# Patient Record
Sex: MALE | Race: WHITE | NOT HISPANIC OR LATINO | URBAN - METROPOLITAN AREA
[De-identification: names, ages, dates, MRNs, and addresses within clinical notes are randomized per-mention and may not be internally consistent; named-entity substitution may affect disease eponyms.]

---

## 2017-02-22 ENCOUNTER — EMERGENCY (EMERGENCY)
Facility: HOSPITAL | Age: 52
LOS: 1 days | Discharge: HOME | End: 2017-02-22
Attending: EMERGENCY MEDICINE | Admitting: EMERGENCY MEDICINE
Payer: MEDICARE

## 2017-02-22 VITALS
HEART RATE: 66 BPM | DIASTOLIC BLOOD PRESSURE: 75 MMHG | TEMPERATURE: 98 F | SYSTOLIC BLOOD PRESSURE: 115 MMHG | OXYGEN SATURATION: 96 % | RESPIRATION RATE: 16 BRPM

## 2017-02-22 DIAGNOSIS — F10.129 ALCOHOL ABUSE WITH INTOXICATION, UNSPECIFIED: ICD-10-CM

## 2017-02-22 DIAGNOSIS — F17.210 NICOTINE DEPENDENCE, CIGARETTES, UNCOMPLICATED: ICD-10-CM

## 2017-02-22 DIAGNOSIS — R41.82 ALTERED MENTAL STATUS, UNSPECIFIED: ICD-10-CM

## 2017-02-22 PROCEDURE — 99283 EMERGENCY DEPT VISIT LOW MDM: CPT

## 2017-02-22 NOTE — ED PROVIDER NOTE - MEDICAL DECISION MAKING DETAILS
Awaiting clinical sobriety.   The scribe's documentation has been prepared under my direction and personally reviewed by me in its entirety. I confirm that the note above accurately reflects all work, treatment, procedures, and medical decision making performed by me. - NP Colon 54 y/o M presents to ED c/o with alcohol intoxication.  No overt signs of trauma.  VSS.  NAD.  Pt monitored for sobriety and discharged with clear speech and steady gait.    The scribe's documentation has been prepared under my direction and personally reviewed by me in its entirety. I confirm that the note above accurately reflects all work, treatment, procedures, and medical decision making performed by me. - NP Colon

## 2017-02-22 NOTE — ED PROVIDER NOTE - OBJECTIVE STATEMENT
54 yo M with a hx of alcohol abuse BIBEMS for suspected alcohol intoxication today. Pt was picked up on the street. As per triage note, pt said that last drink was at 8AM. HPI and ROS are limited due to altered mental status. No evidence of trauma, vomiting or seizure noted.

## 2017-02-22 NOTE — ED PROVIDER NOTE - NS ED MD SCRIBE ATTENDING SCRIBE SECTIONS
HISTORY OF PRESENT ILLNESS/HIV/REVIEW OF SYSTEMS/PAST MEDICAL/SURGICAL/SOCIAL HISTORY/DISPOSITION/VITAL SIGNS( Pullset)/PHYSICAL EXAM

## 2017-05-30 ENCOUNTER — EMERGENCY (EMERGENCY)
Facility: HOSPITAL | Age: 52
LOS: 1 days | Discharge: HOME | End: 2017-05-30
Attending: EMERGENCY MEDICINE | Admitting: EMERGENCY MEDICINE
Payer: MEDICARE

## 2017-05-30 VITALS
DIASTOLIC BLOOD PRESSURE: 82 MMHG | OXYGEN SATURATION: 98 % | HEART RATE: 68 BPM | SYSTOLIC BLOOD PRESSURE: 137 MMHG | TEMPERATURE: 97 F | RESPIRATION RATE: 18 BRPM

## 2017-05-30 DIAGNOSIS — R41.82 ALTERED MENTAL STATUS, UNSPECIFIED: ICD-10-CM

## 2017-05-30 DIAGNOSIS — F10.129 ALCOHOL ABUSE WITH INTOXICATION, UNSPECIFIED: ICD-10-CM

## 2017-05-30 PROCEDURE — 99283 EMERGENCY DEPT VISIT LOW MDM: CPT | Mod: 25

## 2017-05-30 NOTE — ED ADULT NURSE NOTE - OBJECTIVE STATEMENT
51y male, BIBEMS for alcohol intoxication. pt admits to drinking today. Denies use of any other drugs. pt noted with redness to left eye, states "I fell a day ago." Denies n/v, confusion, dizziness, chest pain, SOB. No obvious trauma/deformity. Pt is undomiciled. Fall prec initiated. f/s: 148mg/dl.

## 2017-05-30 NOTE — ED ADULT NURSE NOTE - CHPI ED SYMPTOMS NEG
no weakness/no chills/no pain/no abdominal pain/no nausea/no vomiting/no fever/no abdominal distension

## 2017-05-30 NOTE — ED ADULT TRIAGE NOTE - CHIEF COMPLAINT QUOTE
pt biba for alcohol intoxication. pt is alert and awake, also has an injury to left eye. states he has not yet gotten treatment for it and it happened last night.

## 2017-05-31 VITALS
OXYGEN SATURATION: 97 % | RESPIRATION RATE: 20 BRPM | SYSTOLIC BLOOD PRESSURE: 147 MMHG | DIASTOLIC BLOOD PRESSURE: 78 MMHG | HEART RATE: 60 BPM

## 2017-05-31 NOTE — ED PROVIDER NOTE - UNABLE TO OBTAIN
Urgent need for Intervention Patient appears intoxicated, unable to provide a history or cooperate with physical exam.

## 2017-06-12 ENCOUNTER — EMERGENCY (EMERGENCY)
Facility: HOSPITAL | Age: 52
LOS: 1 days | Discharge: HOME | End: 2017-06-12
Attending: EMERGENCY MEDICINE | Admitting: EMERGENCY MEDICINE
Payer: MEDICARE

## 2017-06-12 VITALS
HEART RATE: 86 BPM | SYSTOLIC BLOOD PRESSURE: 120 MMHG | DIASTOLIC BLOOD PRESSURE: 80 MMHG | OXYGEN SATURATION: 99 % | RESPIRATION RATE: 16 BRPM

## 2017-06-12 VITALS
RESPIRATION RATE: 18 BRPM | DIASTOLIC BLOOD PRESSURE: 84 MMHG | TEMPERATURE: 98 F | SYSTOLIC BLOOD PRESSURE: 125 MMHG | HEART RATE: 93 BPM

## 2017-06-12 DIAGNOSIS — F10.129 ALCOHOL ABUSE WITH INTOXICATION, UNSPECIFIED: ICD-10-CM

## 2017-06-12 DIAGNOSIS — F17.200 NICOTINE DEPENDENCE, UNSPECIFIED, UNCOMPLICATED: ICD-10-CM

## 2017-06-12 DIAGNOSIS — Z88.0 ALLERGY STATUS TO PENICILLIN: ICD-10-CM

## 2017-06-12 DIAGNOSIS — R41.82 ALTERED MENTAL STATUS, UNSPECIFIED: ICD-10-CM

## 2017-06-12 PROCEDURE — 99283 EMERGENCY DEPT VISIT LOW MDM: CPT

## 2017-06-12 NOTE — ED PROVIDER NOTE - PHYSICAL EXAMINATION
Gen - Disheveled, +AOB, awake and arousable by verbal stimuli  Skin - warm, dry, intact  HEENT - AT/NC, PERRL, mild conjunctival injection, +L subacute subconjunctival hemorrhage, o/p clear, uvula midline, airway patent, neck supple with no step off   CV - S1S2, R/R/R, +2/6 systolic murmur   Resp - respiration non-labored, CTAB, symmetric bs b/l, no r/r/w  GI - NABS, soft, ND, NT, no rebound or guarding, no CVAT b/l  MS - moving all extremities, no c/c/e   Neuro - Lethargic but arousable by verbal stimuli, slurred speech and unsteady gait   Skin - old sutures to the L eyebrow region, no d/c or bleeding

## 2017-06-12 NOTE — ED ADULT TRIAGE NOTE - CHIEF COMPLAINT QUOTE
As per EMS pt walking in the street and reports to EMS crew that he had been drinking vodka all day.

## 2017-06-12 NOTE — ED PROVIDER NOTE - OBJECTIVE STATEMENT
50 yo M with chronic EtoH abuse, undomiciled, BIBA for public EtOH intox. Admits to having heavy EtOH intake today. Had trauma few days ago with sutures to the L eyebrow region.  Denies trauma/fall today, HA, dizziness, bleeding, N/V/D/C, CP, SOB, palpitations, tremors, change in urinary/bowel function, and abdominal pain. No illicit drug use noted.

## 2017-07-31 ENCOUNTER — EMERGENCY (EMERGENCY)
Facility: HOSPITAL | Age: 52
LOS: 1 days | Discharge: HOME | End: 2017-07-31
Attending: EMERGENCY MEDICINE | Admitting: EMERGENCY MEDICINE
Payer: SELF-PAY

## 2017-07-31 VITALS
HEART RATE: 98 BPM | OXYGEN SATURATION: 96 % | TEMPERATURE: 99 F | RESPIRATION RATE: 16 BRPM | SYSTOLIC BLOOD PRESSURE: 139 MMHG | DIASTOLIC BLOOD PRESSURE: 90 MMHG

## 2017-07-31 VITALS
RESPIRATION RATE: 16 BRPM | DIASTOLIC BLOOD PRESSURE: 63 MMHG | SYSTOLIC BLOOD PRESSURE: 172 MMHG | HEART RATE: 74 BPM | OXYGEN SATURATION: 100 %

## 2017-07-31 DIAGNOSIS — F10.10 ALCOHOL ABUSE, UNCOMPLICATED: ICD-10-CM

## 2017-07-31 DIAGNOSIS — R56.9 UNSPECIFIED CONVULSIONS: ICD-10-CM
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
## 2017-07-31 DIAGNOSIS — I10 ESSENTIAL (PRIMARY) HYPERTENSION: ICD-10-CM

## 2017-07-31 DIAGNOSIS — F17.200 NICOTINE DEPENDENCE, UNSPECIFIED, UNCOMPLICATED: ICD-10-CM

## 2017-07-31 LAB
ANION GAP SERPL CALC-SCNC: 12 MMOL/L — SIGNIFICANT CHANGE UP (ref 9–16)
BUN SERPL-MCNC: 6 MG/DL — LOW (ref 7–23)
CALCIUM SERPL-MCNC: 9.1 MG/DL — SIGNIFICANT CHANGE UP (ref 8.5–10.5)
CHLORIDE SERPL-SCNC: 100 MMOL/L — SIGNIFICANT CHANGE UP (ref 96–108)
CO2 SERPL-SCNC: 24 MMOL/L — SIGNIFICANT CHANGE UP (ref 22–31)
CREAT SERPL-MCNC: 1.03 MG/DL — SIGNIFICANT CHANGE UP (ref 0.5–1.3)
ETHANOL SERPL-MCNC: <3 MG/DL — SIGNIFICANT CHANGE UP
GLUCOSE SERPL-MCNC: 134 MG/DL — HIGH (ref 70–99)
HCT VFR BLD CALC: 38.5 % — LOW (ref 39–50)
HGB BLD-MCNC: 13.4 G/DL — SIGNIFICANT CHANGE UP (ref 13–17)
MCHC RBC-ENTMCNC: 32.3 PG — SIGNIFICANT CHANGE UP (ref 27–34)
MCHC RBC-ENTMCNC: 34.8 G/DL — SIGNIFICANT CHANGE UP (ref 32–36)
MCV RBC AUTO: 92.8 FL — SIGNIFICANT CHANGE UP (ref 80–100)
PLATELET # BLD AUTO: 104 K/UL — LOW (ref 150–400)
POTASSIUM SERPL-MCNC: 3.8 MMOL/L — SIGNIFICANT CHANGE UP (ref 3.5–5.3)
POTASSIUM SERPL-SCNC: 3.8 MMOL/L — SIGNIFICANT CHANGE UP (ref 3.5–5.3)
RBC # BLD: 4.15 M/UL — LOW (ref 4.2–5.8)
RBC # FLD: 13.9 % — SIGNIFICANT CHANGE UP (ref 10.3–16.9)
SODIUM SERPL-SCNC: 136 MMOL/L — SIGNIFICANT CHANGE UP (ref 132–145)
WBC # BLD: 4.6 K/UL — SIGNIFICANT CHANGE UP (ref 3.8–10.5)
WBC # FLD AUTO: 4.6 K/UL — SIGNIFICANT CHANGE UP (ref 3.8–10.5)

## 2017-07-31 PROCEDURE — 70450 CT HEAD/BRAIN W/O DYE: CPT | Mod: 26

## 2017-07-31 PROCEDURE — 99284 EMERGENCY DEPT VISIT MOD MDM: CPT

## 2017-07-31 RX ORDER — SODIUM CHLORIDE 9 MG/ML
1000 INJECTION INTRAMUSCULAR; INTRAVENOUS; SUBCUTANEOUS ONCE
Qty: 0 | Refills: 0 | Status: COMPLETED | OUTPATIENT
Start: 2017-07-31 | End: 2017-07-31

## 2017-07-31 RX ADMIN — Medication 1 MILLIGRAM(S): at 14:26

## 2017-07-31 RX ADMIN — Medication 100 MILLIGRAM(S): at 14:10

## 2017-07-31 RX ADMIN — SODIUM CHLORIDE 1000 MILLILITER(S): 9 INJECTION INTRAMUSCULAR; INTRAVENOUS; SUBCUTANEOUS at 14:10

## 2017-07-31 NOTE — ED ADULT TRIAGE NOTE - CHIEF COMPLAINT QUOTE
Per EMS, patient had witnessed seizure on street. post-ictal when EMS arrived at scene. On arrival to ED, patient alert to self, place, and approx. time. Hx. Chronic alcoholism--last drink "Yesterday". Seizure/fall precautions initiated.

## 2017-07-31 NOTE — ED PROVIDER NOTE - MEDICAL DECISION MAKING DETAILS
Pt well known to ED for Alcohol abuse withdrawal seizures. BIBA for witnessed seizure. Will get head CT, basic labs, alcohol blood level. Gave 1mg ativan IV and 100mg Librium PO. Will reassess. Mildly postictal at this time

## 2017-07-31 NOTE — ED PROVIDER NOTE - OBJECTIVE STATEMENT
51 y/o M    PMHx: Alcohol abuse, HTN    Pt brought in by EMS for witnessed seizure. Pt has had many Alcohol abuse withdrawal seizures in passed and is well known to ED. Admits the last time he drank was at yesterday evening. Denies head injury. Denies any alcohol use today. Denies headache, weakness, numbness, visual changes, speech changes

## 2017-07-31 NOTE — ED PROVIDER NOTE - ATTENDING CONTRIBUTION TO CARE
well known to the ED for alcohol abuse.  BIBEMS for seizure.  Given librium, likely alcohol withdrawal (normal BAL).  ED workup - labs, imaging unremarkable.  No witnessed seizures.  Steady gait.  Discussed findings with patient and cleared for discharge

## 2017-07-31 NOTE — ED PROVIDER NOTE - PROGRESS NOTE DETAILS
Pt now walking with very steady gait- no sign of postictal behavior   CNs intact. Normal Romberg. Normal finger to nose. No pronator drift. Normal rapid alternating movements/heal to shin. Sensation intact to all extremities. 5/5 strength to all extremities.   CT head normal. Workup WNL. No tachycardia- vitals stable. No tremors or shaking.  Will DC home with return precautions

## 2017-07-31 NOTE — ED ADULT NURSE NOTE - OBJECTIVE STATEMENT
Calm answering questions appropriately. Pt states he drinks heavily daily. Has no desire to stop drinking at this time. Has hx of seizures. Pt appears unkempt. Denies any pain. No deformities noted. Pt does not appear tremulous. Calm answering questions appropriately. Pt states he drinks heavily daily. Has no desire to stop drinking at this time. Has hx of seizures. Pt appears unkempt. Denies any pain. No deformities noted. Tremulous when using muscle, not at rest.

## 2017-07-31 NOTE — ED PROVIDER NOTE - ENMT, MLM
Airway patent, Nasal mucosa clear. Mouth with normal mucosa. Throat has no vesicles, no oropharyngeal exudates and uvula is midline. Bitye marks to bilateral [id number]. No active bleeding

## 2017-07-31 NOTE — ED PROVIDER NOTE - CONSTITUTIONAL, MLM
normal... Well disheveled poor hygiene, well nourished, awake, alert, oriented to person, place, time/situation and in no apparent distress.

## 2017-07-31 NOTE — ED PROVIDER NOTE - PHYSICAL EXAMINATION
CNs intact. Normal Romberg. Normal finger to nose. No pronator drift. Normal rapi alternating movements/heal to shin. Sensation intact to all extremities. 5/5 strength to all extremities.

## 2017-11-04 ENCOUNTER — EMERGENCY (EMERGENCY)
Facility: HOSPITAL | Age: 52
LOS: 1 days | Discharge: HOME | End: 2017-11-04
Admitting: EMERGENCY MEDICINE
Payer: MEDICARE

## 2017-11-04 VITALS
SYSTOLIC BLOOD PRESSURE: 107 MMHG | TEMPERATURE: 98 F | DIASTOLIC BLOOD PRESSURE: 70 MMHG | HEART RATE: 80 BPM | OXYGEN SATURATION: 98 % | RESPIRATION RATE: 20 BRPM

## 2017-11-04 DIAGNOSIS — R41.82 ALTERED MENTAL STATUS, UNSPECIFIED: ICD-10-CM

## 2017-11-04 DIAGNOSIS — Z88.0 ALLERGY STATUS TO PENICILLIN: ICD-10-CM

## 2017-11-04 DIAGNOSIS — F10.129 ALCOHOL ABUSE WITH INTOXICATION, UNSPECIFIED: ICD-10-CM

## 2017-11-04 PROCEDURE — 99283 EMERGENCY DEPT VISIT LOW MDM: CPT

## 2017-11-04 NOTE — ED PROVIDER NOTE - OBJECTIVE STATEMENT
52 y/o Male BIBA for AMs. Pt was found on the street intoxicated. At this time, pt does not have any complaints and denies pain. Pt is a poor historian.

## 2017-11-04 NOTE — ED PROVIDER NOTE - MEDICAL DECISION MAKING DETAILS
No signs of clinical trauma, will wait until sobriety to discharge. patient BIB EMS for AMS with ETOH intoxication. No signs of trauma, will observe until clinical sobriety

## 2017-11-04 NOTE — ED PROVIDER NOTE - PMH
Alcohol abuse    Alcohol abuse    ETOH abuse    ETOH abuse    No pertinent past medical history    Seizure

## 2017-11-04 NOTE — ED PROVIDER NOTE - PROGRESS NOTE DETAILS
The scribe's documentation has been prepared under my direction and personally reviewed by me in its entirety. I confirm that the note above accurately reflects all work, treatment, procedures, and medical decision making performed by me. signed out to me pending clinical sobriety. Patient is now a&ox3, coherent, steady gait, will d/c.

## 2017-11-16 ENCOUNTER — EMERGENCY (EMERGENCY)
Facility: HOSPITAL | Age: 52
LOS: 1 days | Discharge: HOME | End: 2017-11-16
Admitting: EMERGENCY MEDICINE
Payer: SELF-PAY

## 2017-11-16 VITALS
SYSTOLIC BLOOD PRESSURE: 110 MMHG | RESPIRATION RATE: 16 BRPM | HEART RATE: 78 BPM | OXYGEN SATURATION: 100 % | DIASTOLIC BLOOD PRESSURE: 68 MMHG | TEMPERATURE: 97 F

## 2017-11-16 VITALS
OXYGEN SATURATION: 99 % | TEMPERATURE: 98 F | SYSTOLIC BLOOD PRESSURE: 116 MMHG | RESPIRATION RATE: 16 BRPM | DIASTOLIC BLOOD PRESSURE: 74 MMHG | HEART RATE: 63 BPM

## 2017-11-16 DIAGNOSIS — F10.129 ALCOHOL ABUSE WITH INTOXICATION, UNSPECIFIED: ICD-10-CM
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
## 2017-11-16 DIAGNOSIS — S60.417A ABRASION OF LEFT LITTLE FINGER, INITIAL ENCOUNTER: ICD-10-CM
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
## 2017-11-16 DIAGNOSIS — R41.82 ALTERED MENTAL STATUS, UNSPECIFIED: ICD-10-CM

## 2017-11-16 DIAGNOSIS — Z88.0 ALLERGY STATUS TO PENICILLIN: ICD-10-CM

## 2017-11-16 PROCEDURE — 99284 EMERGENCY DEPT VISIT MOD MDM: CPT

## 2017-11-16 NOTE — ED PROVIDER NOTE - MEDICAL DECISION MAKING DETAILS
alcohol intox with multiple ED visits for same.  abrasion.  local wound care done in ED.  pt fed and appropriate for d/c  At the time of discharge from the Emergency Department, the patient is alert with fluent appropriate speech and ambulatory without difficulty. A complete medical screening examination was performed and no emergency medical condition was identified.

## 2017-11-16 NOTE — ED PROVIDER NOTE - OBJECTIVE STATEMENT
Pt is 50 yo male bib ems for alcohol intoxication.  Pt states "I need food".  Pt noted to have abrasion to left 5th dorsal finger with no deep injury or decreased ROM.  No other signs of trauma and pt with no medical complaints.

## 2017-11-16 NOTE — ED PROVIDER NOTE - PHYSICAL EXAMINATION
General: lethargic, arousable to touch, smells of alcohol  Head: NCAT  Eyes: PERRL  Heart: RRR  Lungs: CTAB  Abd: soft, NTND  Neuro: moves all 4 extremities equally  Skin: no e/o lacerations, +superficial abrasion to left 5th finger NVI and no deep injury, or ecchymoses

## 2018-01-17 ENCOUNTER — EMERGENCY (EMERGENCY)
Facility: HOSPITAL | Age: 53
LOS: 1 days | Discharge: HOME | End: 2018-01-17
Attending: EMERGENCY MEDICINE
Payer: SELF-PAY

## 2018-01-17 VITALS
HEIGHT: 68 IN | TEMPERATURE: 99 F | SYSTOLIC BLOOD PRESSURE: 132 MMHG | HEART RATE: 105 BPM | DIASTOLIC BLOOD PRESSURE: 76 MMHG | OXYGEN SATURATION: 97 % | WEIGHT: 160.06 LBS | RESPIRATION RATE: 16 BRPM

## 2018-01-17 PROCEDURE — 99053 MED SERV 10PM-8AM 24 HR FAC: CPT

## 2018-01-17 PROCEDURE — 99282 EMERGENCY DEPT VISIT SF MDM: CPT | Mod: 25

## 2018-01-17 PROCEDURE — 99283 EMERGENCY DEPT VISIT LOW MDM: CPT

## 2018-01-17 NOTE — ED PROVIDER NOTE - MEDICAL DECISION MAKING DETAILS
Pt is a 51 y/o undomiciled M c/o generalized body pain. Pt requesting food, declining analgesia, not actively intoxicated, will feed, observe, and discharge.

## 2018-01-17 NOTE — ED PROVIDER NOTE - OBJECTIVE STATEMENT
Pt is a 53 y/o undomiciled M (PMHx: EtOH abuse, seizure disorder) c/o  general malaise. Pt was BIB EMS earlier today [place], pt denies any trauma.

## 2018-03-16 ENCOUNTER — EMERGENCY (EMERGENCY)
Facility: HOSPITAL | Age: 53
LOS: 1 days | Discharge: HOME | End: 2018-03-16
Attending: EMERGENCY MEDICINE
Payer: SELF-PAY

## 2018-03-16 VITALS
SYSTOLIC BLOOD PRESSURE: 138 MMHG | OXYGEN SATURATION: 97 % | RESPIRATION RATE: 16 BRPM | WEIGHT: 179.9 LBS | DIASTOLIC BLOOD PRESSURE: 87 MMHG | HEIGHT: 72 IN | HEART RATE: 72 BPM | TEMPERATURE: 98 F

## 2018-03-16 VITALS
TEMPERATURE: 98 F | OXYGEN SATURATION: 97 % | HEART RATE: 55 BPM | DIASTOLIC BLOOD PRESSURE: 58 MMHG | RESPIRATION RATE: 16 BRPM | SYSTOLIC BLOOD PRESSURE: 101 MMHG

## 2018-03-16 LAB
ALBUMIN SERPL ELPH-MCNC: 3.3 G/DL — LOW (ref 3.5–5)
ALP SERPL-CCNC: 86 U/L — SIGNIFICANT CHANGE UP (ref 40–120)
ALT FLD-CCNC: 18 U/L DA — SIGNIFICANT CHANGE UP (ref 10–60)
ANION GAP SERPL CALC-SCNC: 11 MMOL/L — SIGNIFICANT CHANGE UP (ref 5–17)
AST SERPL-CCNC: 27 U/L — SIGNIFICANT CHANGE UP (ref 10–40)
BASOPHILS # BLD AUTO: 0.1 K/UL — SIGNIFICANT CHANGE UP (ref 0–0.2)
BASOPHILS NFR BLD AUTO: 1.3 % — SIGNIFICANT CHANGE UP (ref 0–2)
BILIRUB SERPL-MCNC: 0.2 MG/DL — SIGNIFICANT CHANGE UP (ref 0.2–1.2)
BUN SERPL-MCNC: 16 MG/DL — SIGNIFICANT CHANGE UP (ref 7–18)
CALCIUM SERPL-MCNC: 7.9 MG/DL — LOW (ref 8.4–10.5)
CHLORIDE SERPL-SCNC: 110 MMOL/L — HIGH (ref 96–108)
CO2 SERPL-SCNC: 25 MMOL/L — SIGNIFICANT CHANGE UP (ref 22–31)
CREAT SERPL-MCNC: 0.67 MG/DL — SIGNIFICANT CHANGE UP (ref 0.5–1.3)
EOSINOPHIL # BLD AUTO: 0.1 K/UL — SIGNIFICANT CHANGE UP (ref 0–0.5)
EOSINOPHIL NFR BLD AUTO: 2.9 % — SIGNIFICANT CHANGE UP (ref 0–6)
ETHANOL SERPL-MCNC: 388 MG/DL — HIGH (ref 0–10)
GLUCOSE SERPL-MCNC: 108 MG/DL — HIGH (ref 70–99)
HCT VFR BLD CALC: 40.1 % — SIGNIFICANT CHANGE UP (ref 39–50)
HGB BLD-MCNC: 12.8 G/DL — LOW (ref 13–17)
LYMPHOCYTES # BLD AUTO: 1.9 K/UL — SIGNIFICANT CHANGE UP (ref 1–3.3)
LYMPHOCYTES # BLD AUTO: 43.8 % — SIGNIFICANT CHANGE UP (ref 13–44)
MCHC RBC-ENTMCNC: 31.8 GM/DL — LOW (ref 32–36)
MCHC RBC-ENTMCNC: 32.1 PG — SIGNIFICANT CHANGE UP (ref 27–34)
MCV RBC AUTO: 100.9 FL — HIGH (ref 80–100)
MONOCYTES # BLD AUTO: 0.3 K/UL — SIGNIFICANT CHANGE UP (ref 0–0.9)
MONOCYTES NFR BLD AUTO: 6.3 % — SIGNIFICANT CHANGE UP (ref 2–14)
NEUTROPHILS # BLD AUTO: 2 K/UL — SIGNIFICANT CHANGE UP (ref 1.8–7.4)
NEUTROPHILS NFR BLD AUTO: 45.6 % — SIGNIFICANT CHANGE UP (ref 43–77)
PCP SPEC-MCNC: SIGNIFICANT CHANGE UP
PLATELET # BLD AUTO: 164 K/UL — SIGNIFICANT CHANGE UP (ref 150–400)
POTASSIUM SERPL-MCNC: 3.6 MMOL/L — SIGNIFICANT CHANGE UP (ref 3.5–5.3)
POTASSIUM SERPL-SCNC: 3.6 MMOL/L — SIGNIFICANT CHANGE UP (ref 3.5–5.3)
PROT SERPL-MCNC: 6.9 G/DL — SIGNIFICANT CHANGE UP (ref 6–8.3)
RBC # BLD: 3.97 M/UL — LOW (ref 4.2–5.8)
RBC # FLD: 12.6 % — SIGNIFICANT CHANGE UP (ref 10.3–14.5)
SODIUM SERPL-SCNC: 146 MMOL/L — HIGH (ref 135–145)
WBC # BLD: 4.3 K/UL — SIGNIFICANT CHANGE UP (ref 3.8–10.5)
WBC # FLD AUTO: 4.3 K/UL — SIGNIFICANT CHANGE UP (ref 3.8–10.5)

## 2018-03-16 PROCEDURE — 80053 COMPREHEN METABOLIC PANEL: CPT

## 2018-03-16 PROCEDURE — 80307 DRUG TEST PRSMV CHEM ANLYZR: CPT

## 2018-03-16 PROCEDURE — 99284 EMERGENCY DEPT VISIT MOD MDM: CPT | Mod: 25

## 2018-03-16 PROCEDURE — 99285 EMERGENCY DEPT VISIT HI MDM: CPT

## 2018-03-16 PROCEDURE — 85027 COMPLETE CBC AUTOMATED: CPT

## 2018-03-16 PROCEDURE — 82962 GLUCOSE BLOOD TEST: CPT

## 2018-03-16 PROCEDURE — 99053 MED SERV 10PM-8AM 24 HR FAC: CPT

## 2018-03-16 RX ORDER — SODIUM CHLORIDE 9 MG/ML
3 INJECTION INTRAMUSCULAR; INTRAVENOUS; SUBCUTANEOUS ONCE
Qty: 0 | Refills: 0 | Status: COMPLETED | OUTPATIENT
Start: 2018-03-16 | End: 2018-03-16

## 2018-03-16 NOTE — ED PROVIDER NOTE - MUSCULOSKELETAL, MLM
Spine appears normal, range of motion is not limited, no muscle or joint tenderness, moving all extremities spontaneously.

## 2018-03-16 NOTE — ED PROVIDER NOTE - CONSTITUTIONAL, MLM
normal... Well appearing, well nourished, awake, alert, and in no apparent distress. Does not answer questions.

## 2018-03-16 NOTE — ED PROVIDER NOTE - MEDICAL DECISION MAKING DETAILS
[place] likely [place]. In ED pt is uncooperative with exam. Will obtain labs, and observe pending sobriety.

## 2018-03-16 NOTE — ED PROVIDER NOTE - OBJECTIVE STATEMENT
72 y/o M pt BIBA [place]. Pt is uncooperative on exam, only stating that "my name is asshole." NKDA.

## 2018-03-16 NOTE — ED PROVIDER NOTE - PROGRESS NOTE DETAILS
labs show alcohol level 388, Utox neg  will continue to observe for sobriety on reeval patient awake and alert. Will give pt food, will ambulate and dc.

## 2018-03-22 ENCOUNTER — EMERGENCY (EMERGENCY)
Facility: HOSPITAL | Age: 53
LOS: 1 days | Discharge: HOME | End: 2018-03-22
Attending: EMERGENCY MEDICINE | Admitting: EMERGENCY MEDICINE
Payer: MEDICARE

## 2018-03-22 VITALS
SYSTOLIC BLOOD PRESSURE: 116 MMHG | OXYGEN SATURATION: 100 % | DIASTOLIC BLOOD PRESSURE: 73 MMHG | RESPIRATION RATE: 16 BRPM | HEART RATE: 66 BPM | TEMPERATURE: 97 F

## 2018-03-22 VITALS
SYSTOLIC BLOOD PRESSURE: 115 MMHG | TEMPERATURE: 97 F | DIASTOLIC BLOOD PRESSURE: 65 MMHG | OXYGEN SATURATION: 96 % | RESPIRATION RATE: 16 BRPM | HEART RATE: 77 BPM

## 2018-03-22 DIAGNOSIS — Z88.0 ALLERGY STATUS TO PENICILLIN: ICD-10-CM

## 2018-03-22 DIAGNOSIS — R41.82 ALTERED MENTAL STATUS, UNSPECIFIED: ICD-10-CM

## 2018-03-22 DIAGNOSIS — F10.129 ALCOHOL ABUSE WITH INTOXICATION, UNSPECIFIED: ICD-10-CM

## 2018-03-22 PROCEDURE — 99218: CPT

## 2018-03-22 NOTE — ED PROVIDER NOTE - PMH
Alcohol abuse    Alcohol abuse    Alcohol abuse    ETOH abuse    ETOH abuse    No pertinent past medical history    Seizure

## 2018-03-22 NOTE — ED ADULT NURSE NOTE - CHPI ED SYMPTOMS NEG
no abdominal distension/no abdominal pain/no pain/no vomiting/no nausea/no fever/no weakness/no chills

## 2018-03-22 NOTE — ED PROVIDER NOTE - OBJECTIVE STATEMENT
53 y/o Male BIBA for AMS. Pt was found on the floor intoxicated and started to stumble around the streets. In triage, pt is awake and yelling. Denies injuries, pain, or trauma at this time.

## 2018-03-22 NOTE — ED ADULT NURSE NOTE - OBJECTIVE STATEMENT
Patient is a 53 y/o male BIBA for ETOH intoxication. +AOB. Admits to ETOH use. Skin intact, in NAD, resting comfortably, and will continue to monitor.

## 2018-05-31 ENCOUNTER — EMERGENCY (EMERGENCY)
Facility: HOSPITAL | Age: 53
LOS: 1 days | Discharge: HOME | End: 2018-05-31
Admitting: EMERGENCY MEDICINE
Payer: MEDICARE

## 2018-05-31 VITALS
HEART RATE: 83 BPM | RESPIRATION RATE: 18 BRPM | SYSTOLIC BLOOD PRESSURE: 100 MMHG | TEMPERATURE: 98 F | DIASTOLIC BLOOD PRESSURE: 62 MMHG | OXYGEN SATURATION: 98 %

## 2018-05-31 DIAGNOSIS — R41.82 ALTERED MENTAL STATUS, UNSPECIFIED: ICD-10-CM

## 2018-05-31 DIAGNOSIS — F10.129 ALCOHOL ABUSE WITH INTOXICATION, UNSPECIFIED: ICD-10-CM

## 2018-05-31 PROCEDURE — 99283 EMERGENCY DEPT VISIT LOW MDM: CPT

## 2018-05-31 NOTE — ED PROVIDER NOTE - MEDICAL DECISION MAKING DETAILS
Alcohol intoxication, no signs of trauma, not hypoglycemic, neuro exam non-focal, will observe and reassess frequently until sobriety.

## 2018-05-31 NOTE — ED PROVIDER NOTE - OBJECTIVE STATEMENT
52 y o male with pmhx of etOH abuse was brought in by EMS and found intoxicated in [address] Passerby called EMS. Denied etoH use or any drug use. HPI unobtainable due to pt cooperation. 52 y o male with pmhx of etOH abuse was brought in by EMS and found intoxicated [address] Passerby called EMS. Admits to EtOH use, denies any drug use. HPI unobtainable due to pt cooperation.

## 2018-05-31 NOTE — ED ADULT NURSE NOTE - OBJECTIVE STATEMENT
Alcohol intoxication, found on streets sleeping. No visible trauma noted. Patient loud and hostiles towards staff

## 2018-05-31 NOTE — ED ADULT NURSE NOTE - PMH
Alcohol abuse    Alcohol abuse    Alcohol abuse    ETOH abuse    ETOH abuse    No pertinent past medical history    No pertinent past medical history    Seizure

## 2018-05-31 NOTE — ED PROVIDER NOTE - PHYSICAL EXAMINATION
General: lethargic, arousable to touch  Head: NCAT  Eyes: PERRL  Heart: RRR  Lungs: CTAB  Abd: soft, NTND  Neuro: moves all 4 extremities equally  Skin: no e/o lacerations, abrasions, or ecchymoses General: lethargic, arousable to touch  Head: NCAT  Eyes: PERRL  Heart: RRR  Lungs: CTAB  Abd: soft, NTND  Neuro: moves all 4 extremities equally, walking with steady gait, speaking in clear sentences to demand discharge.   Skin: no e/o lacerations, abrasions, or ecchymoses  MSK: no head trauma, neck or back tenderness

## 2018-07-28 ENCOUNTER — EMERGENCY (EMERGENCY)
Facility: HOSPITAL | Age: 53
LOS: 1 days | Discharge: HOME | End: 2018-07-28
Attending: EMERGENCY MEDICINE | Admitting: EMERGENCY MEDICINE
Payer: MEDICARE

## 2018-07-28 VITALS
WEIGHT: 145.06 LBS | SYSTOLIC BLOOD PRESSURE: 121 MMHG | TEMPERATURE: 98 F | OXYGEN SATURATION: 96 % | DIASTOLIC BLOOD PRESSURE: 76 MMHG | HEART RATE: 91 BPM | RESPIRATION RATE: 15 BRPM

## 2018-07-28 DIAGNOSIS — F10.129 ALCOHOL ABUSE WITH INTOXICATION, UNSPECIFIED: ICD-10-CM

## 2018-07-28 DIAGNOSIS — Z88.0 ALLERGY STATUS TO PENICILLIN: ICD-10-CM

## 2018-07-28 PROCEDURE — 99283 EMERGENCY DEPT VISIT LOW MDM: CPT

## 2018-07-28 NOTE — ED PROVIDER NOTE - OBJECTIVE STATEMENT
52 year old male that is an alcoholic was brought to the ED after drinking. he denies trauma and does not want to be here.

## 2018-07-28 NOTE — ED PROVIDER NOTE - DISCHARGE REVIEW MATERIAL PRESENTED
. 80 y/o M with PMHx  of DM, HTN, Chronic lymphedema of bilateral legs, left knee arthritis, DVT of bilateral legs (left peroneal vein and right posterior tibial veins, on Xarelto for sometime then stopped), BPH, and PSHx of right incarcerated inguinal hernia repair presented to the ED with c/o b/l LE swelling and SOB.

## 2018-07-28 NOTE — ED ADULT NURSE NOTE - OBJECTIVE STATEMENT
PT awake and alert, brought in by EMS for alcohol intoxication. Pt becoming belligerent, cursing and yelling at RN stating "I have my rights, let me out!" Pt walking with steady gait. Security called for safety precautions.

## 2018-07-28 NOTE — ED ADULT NURSE NOTE - CHIEF COMPLAINT QUOTE
Pt brought in by EMS after pt was found asleep in the middle of the sidewalk at [facility]. Pt undomiciled admits to drinking alcohol tonight. Pt A & O X 3, walking with steady gait.

## 2018-07-28 NOTE — ED ADULT TRIAGE NOTE - CHIEF COMPLAINT QUOTE
Pt brought in by EMS after pt was found asleep in the middle of the sidewalk at [facility]. Pt undomiciled admits to drinking alcohol tonight. Pt A & O X 3, walking with steady gait.

## 2018-07-29 PROBLEM — F10.10 ALCOHOL ABUSE, UNCOMPLICATED: Chronic | Status: ACTIVE | Noted: 2017-02-22

## 2018-09-26 ENCOUNTER — EMERGENCY (EMERGENCY)
Facility: HOSPITAL | Age: 53
LOS: 1 days | Discharge: HOME | End: 2018-09-26
Attending: EMERGENCY MEDICINE | Admitting: EMERGENCY MEDICINE
Payer: MEDICARE

## 2018-09-26 VITALS
HEART RATE: 108 BPM | TEMPERATURE: 98 F | DIASTOLIC BLOOD PRESSURE: 96 MMHG | SYSTOLIC BLOOD PRESSURE: 138 MMHG | RESPIRATION RATE: 18 BRPM | OXYGEN SATURATION: 98 %

## 2018-09-26 DIAGNOSIS — R53.81 OTHER MALAISE: ICD-10-CM

## 2018-09-26 DIAGNOSIS — F10.10 ALCOHOL ABUSE, UNCOMPLICATED: ICD-10-CM

## 2018-09-26 DIAGNOSIS — I10 ESSENTIAL (PRIMARY) HYPERTENSION: ICD-10-CM

## 2018-09-26 DIAGNOSIS — Z88.0 ALLERGY STATUS TO PENICILLIN: ICD-10-CM

## 2018-09-26 PROCEDURE — 99283 EMERGENCY DEPT VISIT LOW MDM: CPT

## 2018-09-26 NOTE — ED ADULT NURSE NOTE - NSIMPLEMENTINTERV_GEN_ALL_ED
Implemented All Universal Safety Interventions:  [place] to call system. Call bell, personal items and telephone within reach. Instruct patient to call for assistance. Room bathroom lighting operational. Non-slip footwear when patient is off stretcher. Physically safe environment: no spills, clutter or unnecessary equipment. Stretcher in lowest position, wheels locked, appropriate side rails in place.

## 2018-09-26 NOTE — ED PROVIDER NOTE - PSH
No significant past surgical history    No significant past surgical history    No significant past surgical history    No significant past surgical history

## 2018-09-26 NOTE — ED PROVIDER NOTE - OBJECTIVE STATEMENT
52 y o homeless male with PMHX of etoh abuse and HTN was bib [facility] for no specific complaint. Claims he was walking down the street when the EMS pulled up on him and took him to this ED. Has no medical complaints and would like to be discharged with a change of clothes.

## 2018-09-26 NOTE — ED ADULT TRIAGE NOTE - CHIEF COMPLAINT QUOTE
Pt. presents to the ED for generalized bodyaches, pt. currently in a hospital gown with no other clothing reports recently being discharged from another facility.

## 2018-09-26 NOTE — ED ADULT NURSE NOTE - OBJECTIVE STATEMENT
pt. presents to the ED c/o bodyaches, pt. requesting clothes and to speak with . Pt. currently in an old hospital gown and sneakers, no other clothing with patient. Noticed bandage from an IV removal to the left arm, pt. reports being in a hospital yesterday.

## 2018-09-26 NOTE — ED PROVIDER NOTE - CONSTITUTIONAL, MLM
normal... Unkempt and disheveled, well nourished, awake, alert, oriented to person, place, time/situation and in no apparent distress.

## 2018-09-26 NOTE — ED PROVIDER NOTE - CHPI ED SYMPTOMS NEG
no chills/no dizziness/no tingling/no fever/no nausea/no vomiting/no decreased eating/drinking/no numbness/no pain/no weakness

## 2018-09-26 NOTE — ED PROVIDER NOTE - MEDICAL DECISION MAKING DETAILS
At the present time patient requests clothes and food and offered patient SBIRT services and social work.  He states he has no other complaints at this time.

## 2018-12-19 NOTE — ED PROVIDER NOTE - NS_EDPROVIDERDISPOUSERTYPE_ED_A_ED
DC instructions Scribe Attestation (For Scribes USE Only)... Scribe Attestation (For Scribes USE Only).../I have personally evaluated and examined the patient. The Attending was available to me as a supervising provider if needed.

## 2019-01-20 ENCOUNTER — EMERGENCY (EMERGENCY)
Facility: HOSPITAL | Age: 54
LOS: 1 days | Discharge: HOME | End: 2019-01-20
Attending: EMERGENCY MEDICINE
Payer: MEDICARE

## 2019-01-20 VITALS
HEIGHT: 69 IN | WEIGHT: 169.98 LBS | OXYGEN SATURATION: 97 % | SYSTOLIC BLOOD PRESSURE: 124 MMHG | HEART RATE: 78 BPM | DIASTOLIC BLOOD PRESSURE: 82 MMHG | RESPIRATION RATE: 18 BRPM | TEMPERATURE: 97 F

## 2019-01-20 VITALS
DIASTOLIC BLOOD PRESSURE: 74 MMHG | TEMPERATURE: 98 F | OXYGEN SATURATION: 98 % | RESPIRATION RATE: 18 BRPM | SYSTOLIC BLOOD PRESSURE: 125 MMHG | HEART RATE: 74 BPM

## 2019-01-20 DIAGNOSIS — F10.10 ALCOHOL ABUSE, UNCOMPLICATED: ICD-10-CM

## 2019-01-20 DIAGNOSIS — F32.89 OTHER SPECIFIED DEPRESSIVE EPISODES: ICD-10-CM

## 2019-01-20 LAB
ALBUMIN SERPL ELPH-MCNC: 3.3 G/DL — LOW (ref 3.5–5)
ALP SERPL-CCNC: 84 U/L — SIGNIFICANT CHANGE UP (ref 40–120)
ALT FLD-CCNC: 18 U/L DA — SIGNIFICANT CHANGE UP (ref 10–60)
ANION GAP SERPL CALC-SCNC: 11 MMOL/L — SIGNIFICANT CHANGE UP (ref 5–17)
APAP SERPL-MCNC: <2 UG/ML — LOW (ref 10–30)
AST SERPL-CCNC: 26 U/L — SIGNIFICANT CHANGE UP (ref 10–40)
BASOPHILS # BLD AUTO: 0.1 K/UL — SIGNIFICANT CHANGE UP (ref 0–0.2)
BASOPHILS NFR BLD AUTO: 1 % — SIGNIFICANT CHANGE UP (ref 0–2)
BILIRUB SERPL-MCNC: 0.3 MG/DL — SIGNIFICANT CHANGE UP (ref 0.2–1.2)
BUN SERPL-MCNC: 6 MG/DL — LOW (ref 7–18)
CALCIUM SERPL-MCNC: 8.5 MG/DL — SIGNIFICANT CHANGE UP (ref 8.4–10.5)
CHLORIDE SERPL-SCNC: 111 MMOL/L — HIGH (ref 96–108)
CO2 SERPL-SCNC: 25 MMOL/L — SIGNIFICANT CHANGE UP (ref 22–31)
CREAT SERPL-MCNC: 0.57 MG/DL — SIGNIFICANT CHANGE UP (ref 0.5–1.3)
EOSINOPHIL # BLD AUTO: 0.9 K/UL — HIGH (ref 0–0.5)
EOSINOPHIL NFR BLD AUTO: 12.7 % — HIGH (ref 0–6)
ETHANOL SERPL-MCNC: 277 MG/DL — HIGH (ref 0–10)
ETHANOL SERPL-MCNC: 29 MG/DL — HIGH (ref 0–10)
GLUCOSE SERPL-MCNC: 92 MG/DL — SIGNIFICANT CHANGE UP (ref 70–99)
HCT VFR BLD CALC: 41 % — SIGNIFICANT CHANGE UP (ref 39–50)
HGB BLD-MCNC: 13.5 G/DL — SIGNIFICANT CHANGE UP (ref 13–17)
LIDOCAIN IGE QN: 185 U/L — SIGNIFICANT CHANGE UP (ref 73–393)
LYMPHOCYTES # BLD AUTO: 1.1 K/UL — SIGNIFICANT CHANGE UP (ref 1–3.3)
LYMPHOCYTES # BLD AUTO: 16.4 % — SIGNIFICANT CHANGE UP (ref 13–44)
MCHC RBC-ENTMCNC: 31.4 PG — SIGNIFICANT CHANGE UP (ref 27–34)
MCHC RBC-ENTMCNC: 33.1 GM/DL — SIGNIFICANT CHANGE UP (ref 32–36)
MCV RBC AUTO: 94.9 FL — SIGNIFICANT CHANGE UP (ref 80–100)
MONOCYTES # BLD AUTO: 0.2 K/UL — SIGNIFICANT CHANGE UP (ref 0–0.9)
MONOCYTES NFR BLD AUTO: 3.6 % — SIGNIFICANT CHANGE UP (ref 2–14)
NEUTROPHILS # BLD AUTO: 4.6 K/UL — SIGNIFICANT CHANGE UP (ref 1.8–7.4)
NEUTROPHILS NFR BLD AUTO: 66.3 % — SIGNIFICANT CHANGE UP (ref 43–77)
PLATELET # BLD AUTO: 266 K/UL — SIGNIFICANT CHANGE UP (ref 150–400)
POTASSIUM SERPL-MCNC: 4.2 MMOL/L — SIGNIFICANT CHANGE UP (ref 3.5–5.3)
POTASSIUM SERPL-SCNC: 4.2 MMOL/L — SIGNIFICANT CHANGE UP (ref 3.5–5.3)
PROT SERPL-MCNC: 7.3 G/DL — SIGNIFICANT CHANGE UP (ref 6–8.3)
RBC # BLD: 4.31 M/UL — SIGNIFICANT CHANGE UP (ref 4.2–5.8)
RBC # FLD: 12 % — SIGNIFICANT CHANGE UP (ref 10.3–14.5)
SALICYLATES SERPL-MCNC: 2.9 MG/DL — SIGNIFICANT CHANGE UP (ref 2.8–20)
SODIUM SERPL-SCNC: 147 MMOL/L — HIGH (ref 135–145)
WBC # BLD: 6.9 K/UL — SIGNIFICANT CHANGE UP (ref 3.8–10.5)
WBC # FLD AUTO: 6.9 K/UL — SIGNIFICANT CHANGE UP (ref 3.8–10.5)

## 2019-01-20 PROCEDURE — 36415 COLL VENOUS BLD VENIPUNCTURE: CPT

## 2019-01-20 PROCEDURE — 83690 ASSAY OF LIPASE: CPT

## 2019-01-20 PROCEDURE — 90792 PSYCH DIAG EVAL W/MED SRVCS: CPT | Mod: GT

## 2019-01-20 PROCEDURE — 99053 MED SERV 10PM-8AM 24 HR FAC: CPT

## 2019-01-20 PROCEDURE — 85027 COMPLETE CBC AUTOMATED: CPT

## 2019-01-20 PROCEDURE — 99285 EMERGENCY DEPT VISIT HI MDM: CPT

## 2019-01-20 PROCEDURE — 99284 EMERGENCY DEPT VISIT MOD MDM: CPT | Mod: 25

## 2019-01-20 PROCEDURE — 80307 DRUG TEST PRSMV CHEM ANLYZR: CPT

## 2019-01-20 PROCEDURE — 82962 GLUCOSE BLOOD TEST: CPT

## 2019-01-20 PROCEDURE — 80053 COMPREHEN METABOLIC PANEL: CPT

## 2019-01-20 NOTE — ED BEHAVIORAL HEALTH ASSESSMENT NOTE - DESCRIPTION
Patient in Ed Reg at 1:00am and tele was consulted at 16:24. He was intoxicated and was left sleeping, after he woke up they were ready to discharge him but he reported he had SI. His primary Rn is frustrated with him because he is angry and rude to her, he refused to get help from their , even though he told her he was homeless with no-where to go. The nurse believes he is abusing the system because he is very logical in his thought process, when he woke-up he told her his name was ass****, and went ahead and spelled it out for her. She obtained his real name after he was hungry and she exchanged his real name for food. He has been in bed sleeping on and off and he has not been violet, just unpleasant to speak with but if not spoken to he is tranquil. She did not attempt to get labs yet, he is in a gown and appears homeless, he has rashes and cut marks on his body, but no odor. He is on a 1 to 1 ready to be seen. None see hpi

## 2019-01-20 NOTE — ED ADULT NURSE NOTE - OBJECTIVE STATEMENT
pt BIBA for alcohol intoxication. pt awake, unable to obtain history at this time, pt not in distress. pt BIBA for alcohol intoxication. pt awake, unable to obtain history at this time, Pt has rashes over his upper body. pt not in distress. pt BIBA for alcohol intoxication. pt awake, unable to obtain history at this time, Pt has generalized rashes and cut marks over his body. pt not in distress.

## 2019-01-20 NOTE — ED BEHAVIORAL HEALTH ASSESSMENT NOTE - HPI (INCLUDE ILLNESS QUALITY, SEVERITY, DURATION, TIMING, CONTEXT, MODIFYING FACTORS, ASSOCIATED SIGNS AND SYMPTOMS)
53 y o  male, undomiciled, single, unemployed, hx of alcohol abuse, depression; no known prior suicide attempts/aggression/legal/medical hx; bib ems, [place], psych consult called after pt made suicidal statement to SW in the ED.  pt presented with BAL of 277 at 0640; he was seen at 1648, at which time he was clinically sober. he appeared disheveled but was calm, cooperative, organized, aaox4. he states he does not remember making a suicidal statement earlier. he denies any suicidal ideation intent or plan now. he remembers drinking yesterday, admits to "a few beers" and states he passed out at a subway station. reports drinking a few times week, and having "a few" to several beers per drinking session. he endorses chronic depression, he sleeps poorly (states he sleeps in subways and subway stations due to being homeless), has reduced energy and appetite. he denies feeling guilty or hopeless, he denies any suicidal ideation. he denies prior suicidal ideation or suicide attempts, denies access to a gun. he reports being hospitalized "a couple of times" for depression in the past, but can't specify when or where (states "I can't remember.") he denies currently being in any outpatient treatment or on any medications for his depression. states he has been on medications in the past, but cant remember their names. denies any current drug or substance use other than alcohol.  pt denies hallucinations or other psychotic sx's, denies manic symptoms, denies anxiety sx's or other social stressors other than homelessness.   when asked what help he was looking for, pt asked for outpatient treatment (chemical dependency /dual diagnosis treatment), and for a place to stay/shelter referral (pt declined inpt substance use treatment.)  a review of PSYCKES reveals : two ED visits in 2018 for alcohol abuse; one medical admission in 2018 for sepsis; and one assessment in [facility] in 06/18 for dx of "Adjustment Disorder"

## 2019-01-20 NOTE — ED BEHAVIORAL HEALTH ASSESSMENT NOTE - SUMMARY
53 y o  male, undomiciled, single, unemployed, hx of alcohol abuse, depression; no known prior suicide attempts/aggression/legal/medical hx; bib ems, [place], psych consult called after pt made suicidal statement to SW in the ED.  pt presented with alcohol intoxication/SI , reassessed after period of metabolizing alcohol, at which point he had no recollection of making suicidal statement and denied suicidal ideation .   while he reports chronic depression and alcohol abuse, he is not currently suicidal or exhibiting signs of elevate acute risk level. therefore no indication for hospitalization. lack of collateral is not dispositive in this case.  pt is interested in outpatient tx and referal for housing /shelter. he is appropriate for discharge.

## 2019-01-20 NOTE — ED ADULT NURSE REASSESSMENT NOTE - NS ED NURSE REASSESS COMMENT FT1
0720 am - pt asleep since last night _ ETOH with skin rash all over the body , , got morning care , UA via bottle , at 12 pm - axox3 , nad , ate lunch, safety maintain , side rails up , awaiting for  , at 1445 pm - was S/E by  -  AS per  - states as soon as he will be d/c - pt will kill him self , since 1500 on 1;1 observation , was S/E by TEle PSYCH - denies any suicidal or homicidal thought ,still on 1;1 observation. .

## 2019-01-20 NOTE — ED BEHAVIORAL HEALTH ASSESSMENT NOTE - REFERRAL / APPOINTMENT DETAILS
BTCM faxed to [facility] ED outpatient referrals and resources for mental health/chem dependency/dual dx treatment options

## 2019-01-20 NOTE — ED PROVIDER NOTE - PROGRESS NOTE DETAILS
now slightly more arousable with more intelligible words, still not sober. [place]: Patient tolerated meal. No acute distress. Patient homeless will have social work come to provide additional services. [place]: Patient tolerated meal. No acute distress. Patient homeless will have social work come to provide additional services. During eval by  patient reports thoughts of hurting himself. Will place patient on 1:1 and evaluate with additional laboratory workup. Spoke with tele-psych who will evaluate patient. Patient signed out to Dr. Li to follow up tele-psych. Pt ambulatory with steady gait, seen by telepsych, endorses no SI/HI/hallucinations. d/c with referral for shelter, PMD, outpatient services.

## 2019-01-20 NOTE — ED PROVIDER NOTE - OBJECTIVE STATEMENT
approx 52 year old male BIBEMS [place] for suspected intox.  Patient arousable to stimuli, states "what" and then goes back to sleep.  Patient cannot give further hx.

## 2019-01-20 NOTE — ED BEHAVIORAL HEALTH ASSESSMENT NOTE - RISK ASSESSMENT
risk factors include alcohol abuse, isolation and homelessness, depression, prior depression and hospitalizations.  protective factors: denying current suicidal ideation , no marco/psychosis, no longer intoxicated , future oriented (interested in shelter/treatment)  chronic relapsing alcohol use and chronic depression are chronic risk factors.

## 2019-08-02 NOTE — ED PROVIDER NOTE - PRINCIPAL DIAGNOSIS
ETOH abuse
right 4th finger softball injury and nailbed injury - xr to r/o fx, irrigate wound, control bleeding, bulky dressing, reassess

## 2021-02-09 NOTE — ED PROVIDER NOTE - PSYCHIATRIC RISK
Depression    Glaucoma    Hearing loss  bilateral  Hypertension    Macular degeneration    Obesity (BMI 30-39.9)    Osteoarthritis of both knees    Urinary incontinence     no verbalization of thoughts of harm

## 2021-03-05 ENCOUNTER — EMERGENCY (EMERGENCY)
Facility: HOSPITAL | Age: 56
LOS: 1 days | Discharge: HOME | End: 2021-03-05
Attending: EMERGENCY MEDICINE | Admitting: EMERGENCY MEDICINE
Payer: SELF-PAY

## 2021-03-05 VITALS
HEIGHT: 69 IN | OXYGEN SATURATION: 98 % | TEMPERATURE: 98 F | HEART RATE: 85 BPM | DIASTOLIC BLOOD PRESSURE: 71 MMHG | SYSTOLIC BLOOD PRESSURE: 126 MMHG | RESPIRATION RATE: 18 BRPM

## 2021-03-05 DIAGNOSIS — F10.129 ALCOHOL ABUSE WITH INTOXICATION, UNSPECIFIED: ICD-10-CM

## 2021-03-05 DIAGNOSIS — R41.82 ALTERED MENTAL STATUS, UNSPECIFIED: ICD-10-CM

## 2021-03-05 LAB — ETHANOL SERPL-MCNC: 224 MG/DL — HIGH

## 2021-03-05 PROCEDURE — 99283 EMERGENCY DEPT VISIT LOW MDM: CPT

## 2021-03-05 NOTE — ED ADULT TRIAGE NOTE - CHIEF COMPLAINT QUOTE
Pt. picked up from the [facility] for alcohol intoxication and appearing confused. Pt. has a chain on for [facility] william marte in the [facility], number called and spoke with Kta who confirmed pt. is a resident at this facility and he left this morning for "a walk". Pt. known for alcohol abuse.

## 2021-03-05 NOTE — ED PROVIDER NOTE - PATIENT PORTAL LINK FT
You can access the FollowMyHealth Patient Portal offered by [facility] by registering at the following website: [email or web address]. By joining [employer]’s FollowMyHealth portal, you will also be able to view your health information using other applications (apps) compatible with our system.

## 2021-03-05 NOTE — ED ADULT NURSE NOTE - OBJECTIVE STATEMENT
Pt BIBA from [facility] for AMS, admits to ETOH abuse. Pt presents with chain from [facility]'s home, Kat the worker then confirmed he is a resident there. Pt is well known for hx of alcohol abuse there. Pt is speaking in full sentences, airway patent and breathing is spontaneous and unlabored. No visible injuries or traumas.

## 2021-03-05 NOTE — ED ADULT NURSE NOTE - NSIMPLEMENTINTERV_GEN_ALL_ED
Implemented All Universal Safety Interventions:  [place] to call system. Call bell, personal items and telephone within reach. Instruct patient to call for assistance. Room bathroom lighting operational. Non-slip footwear when patient is off stretcher. Physically safe environment: no spills, clutter or unnecessary equipment. Stretcher in lowest position, wheels locked, appropriate side rails in place.

## 2021-03-05 NOTE — ED ADULT NURSE NOTE - CHIEF COMPLAINT QUOTE
Pt. picked up from the [facility] for alcohol intoxication and appearing confused. Pt. has a chain on for [facility] william marte in the [facility], number called and spoke with Kat who confirmed pt. is a resident at this facility and he left this morning for "a walk". Pt. known for alcohol abuse.

## 2021-11-30 ENCOUNTER — EMERGENCY (EMERGENCY)
Facility: HOSPITAL | Age: 56
LOS: 1 days | Discharge: HOME | End: 2021-11-30
Attending: EMERGENCY MEDICINE | Admitting: EMERGENCY MEDICINE
Payer: MEDICAID

## 2021-11-30 VITALS — HEIGHT: 69 IN | WEIGHT: 190.04 LBS

## 2021-11-30 DIAGNOSIS — L29.9 PRURITUS, UNSPECIFIED: ICD-10-CM

## 2021-11-30 DIAGNOSIS — B85.1 PEDICULOSIS DUE TO PEDICULUS HUMANUS CORPORIS: ICD-10-CM

## 2021-11-30 DIAGNOSIS — Z88.0 ALLERGY STATUS TO PENICILLIN: ICD-10-CM

## 2021-11-30 PROCEDURE — 99053 MED SERV 10PM-8AM 24 HR FAC: CPT

## 2021-11-30 PROCEDURE — 99283 EMERGENCY DEPT VISIT LOW MDM: CPT

## 2021-11-30 RX ORDER — PERMETHRIN CREAM 5% W/W 50 MG/G
1 CREAM TOPICAL ONCE
Refills: 0 | Status: COMPLETED | OUTPATIENT
Start: 2021-11-30 | End: 2021-11-30

## 2021-11-30 RX ADMIN — PERMETHRIN CREAM 5% W/W 1 APPLICATION(S): 50 CREAM TOPICAL at 06:47

## 2021-11-30 NOTE — ED ADULT NURSE NOTE - ALCOHOL PRE SCREEN (AUDIT - C)
From: Mona Rosado  To: Milton Scott  Sent: 10/11/2021 1:09 PM CDT  Subject: Change of mail-order pharmacy     Hello Dr. Scott,    I just wanted to let you know that my mail-order pharmacy has changed from [facility] to [facility]. [facility] made this change, so PPO subscribers will now receive their long-term, or maintenance medications through [facility] only.     Thank you & have a blessed week.    Ms. Mona Rosado  
Statement Selected

## 2021-11-30 NOTE — ED PROVIDER NOTE - OBJECTIVE STATEMENT
57 yo M, homeless, well known to ED, hx of etoh abuse, presents with diffuse body lice and itching. patient has no other acute medical complaints. denies falls. refusing to have vs done.

## 2021-11-30 NOTE — ED ADULT NURSE NOTE - CHIEF COMPLAINT QUOTE
[place] c/o generalized "itchiness". Denies any other medical complaints. Pt refusing vital signs.

## 2021-11-30 NOTE — ED ADULT NURSE NOTE - CHPI ED NUR SYMPTOMS POS
NAVIGATE Outreach  A Part of the [facility] First Episode of Psychosis Program     Patient Name: Jerel Day  /Age:  1994 (25 year old)    Contacted Jerel's mom, Mesha to inform her of the team's recommendation for an ACT team. This message was relayed to Jerel's CM by EDELMIRA Morgan on .    Mom has been in contact with MONIQUE Robles who has revoked Jerel's commitment. Reportedly Travis is looking into a Group Home or returning to Jerel's apartment with [facility] and an ACT team that would equate to daily check-ins. Travis has talked with Jerel about this as well.     Currently Jerel is at [facility] awaiting a psychiatry bed in the [facility].     Mom also wanted to let the team know they received Jerel's prescription on Tuesday. Will let RNCC, Brittani Estrella.     SHIRA Camarena  NAVIGATE Director & Family Clinician     [facility] [employer]

## 2021-11-30 NOTE — ED PROVIDER NOTE - PATIENT PORTAL LINK FT
You can access the FollowMyHealth Patient Portal offered by [facility] by registering at the following website: [email or web address]. By joining [employer]’s FollowMyHealth portal, you will also be able to view your health information using other applications (apps) compatible with our system.

## 2022-02-06 ENCOUNTER — EMERGENCY (EMERGENCY)
Facility: HOSPITAL | Age: 57
LOS: 1 days | Discharge: HOME | End: 2022-02-06
Attending: EMERGENCY MEDICINE | Admitting: EMERGENCY MEDICINE
Payer: MEDICAID

## 2022-02-06 VITALS
WEIGHT: 164.91 LBS | DIASTOLIC BLOOD PRESSURE: 110 MMHG | RESPIRATION RATE: 16 BRPM | OXYGEN SATURATION: 100 % | HEART RATE: 122 BPM | HEIGHT: 69 IN | TEMPERATURE: 98 F | SYSTOLIC BLOOD PRESSURE: 174 MMHG

## 2022-02-06 VITALS — RESPIRATION RATE: 16 BRPM | OXYGEN SATURATION: 99 % | HEART RATE: 77 BPM
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
## 2022-02-06 DIAGNOSIS — Z88.0 ALLERGY STATUS TO PENICILLIN: ICD-10-CM

## 2022-02-06 DIAGNOSIS — F10.129 ALCOHOL ABUSE WITH INTOXICATION, UNSPECIFIED: ICD-10-CM

## 2022-02-06 DIAGNOSIS — R07.9 CHEST PAIN, UNSPECIFIED: ICD-10-CM

## 2022-02-06 LAB
ALBUMIN SERPL ELPH-MCNC: 4 G/DL — SIGNIFICANT CHANGE UP (ref 3.4–5)
ALP SERPL-CCNC: 103 U/L — SIGNIFICANT CHANGE UP (ref 40–120)
ALT FLD-CCNC: 21 U/L — SIGNIFICANT CHANGE UP (ref 12–42)
ANION GAP SERPL CALC-SCNC: 11 MMOL/L — SIGNIFICANT CHANGE UP (ref 9–16)
AST SERPL-CCNC: 40 U/L — HIGH (ref 15–37)
BASOPHILS # BLD AUTO: 0.05 K/UL — SIGNIFICANT CHANGE UP (ref 0–0.2)
BASOPHILS NFR BLD AUTO: 0.4 % — SIGNIFICANT CHANGE UP (ref 0–2)
BILIRUB SERPL-MCNC: 0.2 MG/DL — SIGNIFICANT CHANGE UP (ref 0.2–1.2)
BUN SERPL-MCNC: 8 MG/DL — SIGNIFICANT CHANGE UP (ref 7–23)
CALCIUM SERPL-MCNC: 9.1 MG/DL — SIGNIFICANT CHANGE UP (ref 8.5–10.5)
CHLORIDE SERPL-SCNC: 104 MMOL/L — SIGNIFICANT CHANGE UP (ref 96–108)
CO2 SERPL-SCNC: 27 MMOL/L — SIGNIFICANT CHANGE UP (ref 22–31)
CREAT SERPL-MCNC: 0.69 MG/DL — SIGNIFICANT CHANGE UP (ref 0.5–1.3)
EOSINOPHIL # BLD AUTO: 0.1 K/UL — SIGNIFICANT CHANGE UP (ref 0–0.5)
EOSINOPHIL NFR BLD AUTO: 0.8 % — SIGNIFICANT CHANGE UP (ref 0–6)
ETHANOL SERPL-MCNC: 51 MG/DL — HIGH
GLUCOSE SERPL-MCNC: 136 MG/DL — HIGH (ref 70–99)
HCT VFR BLD CALC: 46.6 % — SIGNIFICANT CHANGE UP (ref 39–50)
HGB BLD-MCNC: 14.7 G/DL — SIGNIFICANT CHANGE UP (ref 13–17)
IMM GRANULOCYTES NFR BLD AUTO: 0.2 % — SIGNIFICANT CHANGE UP (ref 0–1.5)
LYMPHOCYTES # BLD AUTO: 1.01 K/UL — SIGNIFICANT CHANGE UP (ref 1–3.3)
LYMPHOCYTES # BLD AUTO: 8 % — LOW (ref 13–44)
MCHC RBC-ENTMCNC: 26 PG — LOW (ref 27–34)
MCHC RBC-ENTMCNC: 31.5 GM/DL — LOW (ref 32–36)
MCV RBC AUTO: 82.3 FL — SIGNIFICANT CHANGE UP (ref 80–100)
MONOCYTES # BLD AUTO: 0.59 K/UL — SIGNIFICANT CHANGE UP (ref 0–0.9)
MONOCYTES NFR BLD AUTO: 4.6 % — SIGNIFICANT CHANGE UP (ref 2–14)
NEUTROPHILS # BLD AUTO: 10.92 K/UL — HIGH (ref 1.8–7.4)
NEUTROPHILS NFR BLD AUTO: 86 % — HIGH (ref 43–77)
NRBC # BLD: 0 /100 WBCS — SIGNIFICANT CHANGE UP (ref 0–0)
PLATELET # BLD AUTO: 284 K/UL — SIGNIFICANT CHANGE UP (ref 150–400)
POTASSIUM SERPL-MCNC: 3.5 MMOL/L — SIGNIFICANT CHANGE UP (ref 3.5–5.3)
POTASSIUM SERPL-SCNC: 3.5 MMOL/L — SIGNIFICANT CHANGE UP (ref 3.5–5.3)
PROT SERPL-MCNC: 8.5 G/DL — HIGH (ref 6.4–8.2)
RBC # BLD: 5.66 M/UL — SIGNIFICANT CHANGE UP (ref 4.2–5.8)
RBC # FLD: 13.7 % — SIGNIFICANT CHANGE UP (ref 10.3–14.5)
SODIUM SERPL-SCNC: 142 MMOL/L — SIGNIFICANT CHANGE UP (ref 132–145)
TROPONIN I, HIGH SENSITIVITY RESULT: 6.1 NG/L — SIGNIFICANT CHANGE UP
WBC # BLD: 12.7 K/UL — HIGH (ref 3.8–10.5)
WBC # FLD AUTO: 12.7 K/UL — HIGH (ref 3.8–10.5)

## 2022-02-06 PROCEDURE — 99285 EMERGENCY DEPT VISIT HI MDM: CPT | Mod: 25

## 2022-02-06 PROCEDURE — 93010 ELECTROCARDIOGRAM REPORT: CPT

## 2022-02-06 PROCEDURE — 71045 X-RAY EXAM CHEST 1 VIEW: CPT | Mod: 26

## 2022-02-06 RX ORDER — SODIUM CHLORIDE 9 MG/ML
1000 INJECTION INTRAMUSCULAR; INTRAVENOUS; SUBCUTANEOUS ONCE
Refills: 0 | Status: DISCONTINUED | OUTPATIENT
Start: 2022-02-06 | End: 2022-02-06

## 2022-02-06 RX ORDER — ASPIRIN/CALCIUM CARB/MAGNESIUM 324 MG
162 TABLET ORAL ONCE
Refills: 0 | Status: COMPLETED | OUTPATIENT
Start: 2022-02-06 | End: 2022-02-06

## 2022-02-06 RX ADMIN — Medication 162 MILLIGRAM(S): at 09:12

## 2022-02-06 NOTE — ED PROVIDER NOTE - NSICDXPASTSURGICALHX_GEN_ALL_CORE_FT
PAST SURGICAL HISTORY:  No significant past surgical history     No significant past surgical history     No significant past surgical history     No significant past surgical history

## 2022-02-06 NOTE — ED PROVIDER NOTE - CLINICAL SUMMARY MEDICAL DECISION MAKING FREE TEXT BOX
The patient's symptoms progressively improved throughout the ED stay.  The patient tolerated PO fluids.  ED evaluation and management discussed with the patient and family (if available) in detail.  Close PMD and/or specialist follow up encouraged.  Strict ED return instructions discussed in detail for any worsening or new symptoms. The patient was given the opportunity to ask any questions about their discharge diagnosis and discharge instructions. The patient verbalized understanding of these instructions and need to return to the ED for any worsening of illness or for any concern. The patient received a printed version of the discharge instructions. The patient understands the Emergency Department diagnosis is a preliminary diagnosis often based on limited information and that the patient must adhere to the follow-up plan as discussed.  At the time of discharge from the Emergency Department, the patient is alert with fluent appropriate speech and ambulatory without difficulty.  A medical screening examination was performed and no emergency medical condition was identified.    ate and drank no vomiting   alert at time of dc HR 85

## 2022-02-06 NOTE — ED PROVIDER NOTE - NSFOLLOWUPINSTRUCTIONS_ED_ALL_ED_FT
stay well hydrated     wear mask / social distance     follow up with your doctor as needed    return to ER for any concern

## 2022-02-06 NOTE — ED ADULT TRIAGE NOTE - CHIEF COMPLAINT QUOTE
Pt brought in by EMS for complaint of chest pain for "months." Pt reports "when it happens, I come here." Pt reports history of high blood pressure for which he has not gone to seek treatment.

## 2022-02-06 NOTE — ED PROVIDER NOTE - NSICDXPASTMEDICALHX_GEN_ALL_CORE_FT
PAST MEDICAL HISTORY:  Alcohol abuse     Alcohol abuse     Alcohol abuse     ETOH abuse     ETOH abuse     Seizure

## 2022-02-06 NOTE — ED PROVIDER NOTE - MUSCULOSKELETAL, MLM
well healed right bka  No cervical, thoracic ot lumbar spine tenderness to percussion or palpation. Flexion/extension of spine without pain.

## 2022-02-06 NOTE — ED PROVIDER NOTE - PATIENT PORTAL LINK FT
You can access the FollowMyHealth Patient Portal offered by [facility] by registering at the following website: [email or web address]. By joining [employer]’s FollowMyHealth portal, you will also be able to view your health information using other applications (apps) compatible with our system.

## 2022-02-06 NOTE — ED PROVIDER NOTE - OBJECTIVE STATEMENT
Triage VS: HR: 112, BP: 165/121, Temp: 99.3 F, Resp: 16, SpO2: 96.   Triage note: Pt brought in by EMS for complaint of chest pain for "months." Pt reports "when it happens, I come here." Pt reports history of high blood pressure for which he has not gone to seek treatment.    59 y/o M with Hx of seizure, and EtOH abuse Triage VS: HR: 122, BP: 174/110, Temp: 98.4 F, Resp: 16, SpO2: 100.   Triage note: Pt brought in by EMS for complaint of chest pain for "months." Pt reports "when it happens, I come here." Pt reports history of high blood pressure for which he has not gone to seek treatment.    61 y/o M with Hx of seizure, and EtOH abuse Triage VS: HR: 122, BP: 174/110, Temp: 98.4 F, Resp: 16, SpO2: 100.   Triage note: Pt brought in by EMS for complaint of chest pain for "months." Pt reports "when it happens, I come here." Pt reports history of high blood pressure for which he has not gone to seek treatment.    59 yo M with chronic EtoH abuse, undomiciled, BIBA for public EtOH intox.  requesting shower Admits to having heavy EtOH intake today.  Denies trauma/fall today, HA, dizziness, bleeding, N/V/D/C, CP, SOB, palpitations, tremors, change in urinary/bowel function, and abdominal pain. No illicit drug use noted. Triage VS: HR: 122, BP: 174/110, Temp: 98.4 F, Resp: 16, SpO2: 100.   Triage note: Pt brought in by EMS for complaint of chest pain for "months." Pt reports "when it happens, I come here." Pt reports history of high blood pressure for which he has not gone to seek treatment.    -multiple previous LHGV for alcohol intoxication reviewed     61 yo M with chronic EtoH abuse, undomiciled, BIBA for public EtOH intox.  requesting shower Admits to having heavy EtOH intake today.  Denies trauma/fall today, HA, dizziness, bleeding, N/V/D/C, CP, SOB, palpitations, tremors, change in urinary/bowel function, and abdominal pain. No illicit drug use noted.

## 2022-02-06 NOTE — ED BEHAVIORAL HEALTH NOTE - BEHAVIORAL HEALTH NOTE
PT is a 56 year old undomicile male with a history of chronic ETOH abuse with leg amputation presented to the ED via EMS with Chest pain.  SW was consulted to the ED by provider to assist PT with wheelchair, food and transportation to shelter intake.  SW gave PT was given a wheelchair, food from the pantry and transportation was arranged with [employer] service as a bill back for 3pm p/u [id number] to the shelter intake center on [address] [place] [address]. Team made aware and SW made available for further assistance. 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  SW was consulted to the ED by provider to assist PT with wheelchair, food and transportation to shelter intake.  PT presented to the ED with smith and prostheses leg.  SW gave PT was given a wheelchair, food from the pantry and transportation was arranged with [employer] service as a bill back for 3pm p/u [id number] to the shelter intake center on [address] [place] [address]. Team made aware and SW made available for further assistance.      Update 3:30pm: PARKER informed PT that he was being discharged from the ED and to start preparing to get dress because the cab was arriving at 3pm.  PT became verbally aggressive and shouted profanity at the SW and refused to be discharged from the ED.  SW informed PT that cab service will arriving to the ED to  PT and take him to the shelter intake center which he agreed to earlier.  PT continued to be verbally aggressive toward SW and ED staff refusing to discharge.  Security was called to the ED and escort PT to the Lobby at 3:09pm. 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  SW was consulted to the ED by provider to assist PT with wheelchair, food and transportation to shelter intake.  PT presented to the ED with smith and prostheses leg.  SW gave PT was given a wheelchair, food from the pantry and transportation was arranged with [employer] as a bill back for 3pm p/u [id number] to the shelter intake center on [address] [place] [address]. Team made aware and SW made available for further assistance.      Update 3:30pm: SW informed PT that he was being discharged from the ED and to start preparing to get dress because his cab will be arriving at 3pm to take him to the shelter intake.  PT became verbally aggressive and shouted profanity at the SW while refusing to be discharged from the ED.  SW informed PT that the cab service will be arriving at the ED to  him up which he agreed to earlier.  PT continued to be verbally aggressive toward SW and ED staff refusing to be discharge.  Security was called to the ED, PT was handed his discharge paper and escort PT to the Lobby at 3:09pm to wait for his cab.

## 2022-02-16 NOTE — ED PROVIDER NOTE - NS ED MD DISPO DISCHARGE
S/w pt and advised of same  Pt verbalized understanding  Appt confirmed with Dr Silke Chambers on 3/4 with a 10:15 arrival time  Home

## 2022-05-10 ENCOUNTER — EMERGENCY (EMERGENCY)
Facility: HOSPITAL | Age: 57
LOS: 1 days | Discharge: HOME | End: 2022-05-10
Attending: EMERGENCY MEDICINE | Admitting: EMERGENCY MEDICINE
Payer: MEDICAID

## 2022-05-10 ENCOUNTER — EMERGENCY (EMERGENCY)
Facility: HOSPITAL | Age: 57
LOS: 1 days | Discharge: HOME | End: 2022-05-10
Attending: STUDENT IN AN ORGANIZED HEALTH CARE EDUCATION/TRAINING PROGRAM | Admitting: STUDENT IN AN ORGANIZED HEALTH CARE EDUCATION/TRAINING PROGRAM
Payer: MEDICAID

## 2022-05-10 VITALS
OXYGEN SATURATION: 100 % | HEART RATE: 80 BPM | RESPIRATION RATE: 16 BRPM | DIASTOLIC BLOOD PRESSURE: 52 MMHG | TEMPERATURE: 98 F | SYSTOLIC BLOOD PRESSURE: 104 MMHG

## 2022-05-10 VITALS
HEART RATE: 99 BPM | TEMPERATURE: 98 F | OXYGEN SATURATION: 100 % | SYSTOLIC BLOOD PRESSURE: 120 MMHG | DIASTOLIC BLOOD PRESSURE: 67 MMHG | RESPIRATION RATE: 18 BRPM

## 2022-05-10 PROCEDURE — 99053 MED SERV 10PM-8AM 24 HR FAC: CPT

## 2022-05-10 PROCEDURE — 99284 EMERGENCY DEPT VISIT MOD MDM: CPT

## 2022-05-10 RX ORDER — ACETAMINOPHEN 500 MG
650 TABLET ORAL ONCE
Refills: 0 | Status: COMPLETED | OUTPATIENT
Start: 2022-05-10 | End: 2022-05-10

## 2022-05-10 RX ADMIN — Medication 650 MILLIGRAM(S): at 23:40

## 2022-05-10 NOTE — ED PROVIDER NOTE - PATIENT PORTAL LINK FT
You can access the FollowMyHealth Patient Portal offered by [facility] by registering at the following website: [email or web address]. By joining [employer]s FollowMyHealth portal, you will also be able to view your health information using other applications (apps) compatible with our system.
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
## 2022-05-10 NOTE — ED PROVIDER NOTE - ATTENDING CONTRIBUTION TO CARE
55 yo male with PMH R BKA for evaluation alcohol intoxication. Pt reports he had not been drinking, was just sleeping. Denies injuries or trauma PE: unkempt,  RRR, CTA BL lungs, abd soft NTND, clear speech A/P medicate, reassess

## 2022-05-10 NOTE — ED ADULT TRIAGE NOTE - CHIEF COMPLAINT QUOTE
Patient brought to ER by EMS from the street. Pt was outside a liquor store on the ground since 1 a.m. Pt has drank about a liter during the day. Patient is homeless,

## 2022-05-10 NOTE — ED PROVIDER NOTE - OBJECTIVE STATEMENT
55yo M R BKA, denies any other medical problems presents after refusing to get off of public transportation. Initially c/o pain to RLE. Denies F/C/NS/N/V/D. +Intoxicated, disheveled appearing 55yo M R BKA, denies any other medical problems presents after refusing to get off of public transportation. Denies F/C/NS/N/V/D. +Intoxicated, disheveled appearing

## 2022-05-10 NOTE — ED ADULT NURSE NOTE - OBJECTIVE STATEMENT
pt received in room 11. PMHx of MARI COSBYWILBERT presents after being found on the ground outside liquor store after leaving the hospital this morning. Patient says he did not drink after leaving the hospital. Says he fell and his L leg hurts. No other complaints. at this time. Resp even and unlabored. Pt lethargic but arousable to verbal stimuli. Will continue to monitor.

## 2022-05-10 NOTE — ED PROVIDER NOTE - PHYSICAL EXAMINATION
G: NAD, sleeping comfortably, cooperative with exam   H: NCAT  E: EOMI   M: Mucous membranes moist   R: nWOB  C: RRR  A: Soft, NT/ND, no rebound/guarding   MSK: no calf tenderness, s/p RLE BKA, no LE edema

## 2022-05-10 NOTE — ED PROVIDER NOTE - PHYSICAL EXAMINATION
GENERAL: no acute distress, non-toxic appearing  HEAD: normocephalic, atraumatic  PULM: no increased work of breathing, normal RR, O2 sats  NEURO: alert and oriented x 3, normal speech, moving all 3 extremities  MSK: no edema, tenderness

## 2022-05-10 NOTE — ED PROVIDER NOTE - CLINICAL SUMMARY MEDICAL DECISION MAKING FREE TEXT BOX
55yo M R BKA, denies any other medical problems presents after refusing to get off of public transportation. Sleeping comfortably. Initially c/o pain to RLE. FROM of RLE, no focal area of tenderness. +Intoxicated, disheveled appearing.

## 2022-05-10 NOTE — ED PROVIDER NOTE - NSFOLLOWUPINSTRUCTIONS_ED_ALL_ED_FT
-- Please do not drink alcohol in excess.  Please seek help for your problematic and/or frequent alcohol use and never drive, make important decisions or use machinery after consuming any amount of alcohol.  Please also do not take tylenol (also called acetaminophen) with any amount of alcohol as this combination can cause permanent liver damage.    Alcohol intoxication occurs when the amount of alcohol that a person has consumed impairs his or her ability to mentally and physically function. Chronic alcohol consumption can also lead to a variety of health issues including neurological disease, stomach disease, heart disease, liver disease, etc. Do not drive after drinking alcohol. Drinking enough alcohol to end up in an Emergency Room suggests you may have an alcohol abuse problem. Seek help at a drug addiction center.    SEEK IMMEDIATE MEDICAL CARE IF YOU HAVE ANY OF THE FOLLOWING SYMPTOMS: seizures, vomiting blood, blood in your stool, lightheadedness/dizziness, or becoming shaky to tremulous when you stop drinking.    Please return to the Emergency Department if you experience any of the following symptoms:   - Shortness of breath or trouble breathing  - Pressure, pain or tightness in the chest  - Face drooping, arm weakness or speech difficulty  - Persistence of severe vomiting  - Head injury or loss of consciousness  - Nonstop bleeding or an open wound    (1) Follow up with your primary care physician within the next 24-48 hours as discussed. In addition, we did not find evidence of a life threatening illness on your testing here today, but listed below are the specialists that will be necessary to see as an outpatient to continue the workup.  Please call the numbers listed below or [phone] to set up the necessary appointments.  (2) If you had an IV (intravenous) line placed, it was removed. Sometimes, after IV removal, that area can be tender for a few days; if it develops redness and swelling, those could be signs of infection; in which case, return to the Emergency Department for assessment.  (3) Please continue taking all of your home medications as directed.

## 2022-05-10 NOTE — ED ADULT NURSE NOTE - PRIMARY CARE PROVIDER
Unknown
All discharge instructions reviewed with patient including pain, safety, medication and follow up care.

## 2022-05-10 NOTE — ED PROVIDER NOTE - NS ED ROS FT
GENERAL: no fever, no chills, no weight loss  EYES: no change in vision, no irritation, no discharge, no redness, no pain  HEENT: no trouble swallowing or speaking, no throat pain, no ear pain  CARDIAC: no chest pain, no palpitations   PULMONARY: no cough, no shortness of breath, no wheezing  GI: no abdominal pain, no nausea, no vomiting, no diarrhea, no constipation, no melena, no hematochezia, no hematemesis  : no changes in urination, no dysuria, no frequency, no hematuria, no discharge  SKIN: no rashes  NEURO: no headache, no numbness, no weakness  MSK: +L leg pain, no joint pain, no muscle pain, no back pain, no calf pain As per HPI  MSK: +R leg pain, no joint pain, no muscle pain, no back pain, no calf pain
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
## 2022-05-10 NOTE — ED PROVIDER NOTE - PATIENT PORTAL LINK FT
You can access the FollowMyHealth Patient Portal offered by [facility] by registering at the following website: [email or web address]. By joining [employer]’s FollowMyHealth portal, you will also be able to view your health information using other applications (apps) compatible with our system. You can access the FollowMyHealth Patient Portal offered by [facility] by registering at the following website: [email or web address]. By joining [employer]’s FollowMyHealth portal, you will also be able to view your health information using other applications (apps) compatible with our system.

## 2022-05-10 NOTE — PROVIDER CONTACT NOTE (OTHER) - ASSESSMENT
Met with pt at bedside; pt reports he is homeless; states he has been in the shelter before and may return to the shelter system.  Pt is requesting a metro[employer].

## 2022-05-10 NOTE — ED PROVIDER NOTE - PROGRESS NOTE DETAILS
Michelle Irwin MD (PGY2) -  Pt seen & reassessed.  Awake alert. NAD.  We discussed anticipatory guidance, stressing importance of prompt f/u, return precautions).  Patient verbalized understanding of ED course & agreed with our f/u recommendations. Pt agrees to return to the ED if there is any worsening or concerning symptoms.

## 2022-05-10 NOTE — ED PROVIDER NOTE - CLINICAL SUMMARY MEDICAL DECISION MAKING FREE TEXT BOX
57 yo M PMHx of BKA presents to ED after found on ground at liquor store. Patient endorses R leg pain below the site of BKA, ongoing. No focal findings. Will give Tylenol, get fingerstick glucose.

## 2022-05-10 NOTE — ED ADULT NURSE NOTE - CHIEF COMPLAINT
I have personally seen and examined this patient.  I have fully participated in the care of this patient. I have reviewed all pertinent clinical information, including history, physical exam, plan and the Resident’s note and agree except as noted.
The patient is a 56y Male complaining of

## 2022-05-10 NOTE — ED ADULT TRIAGE NOTE - INTERNATIONAL TRAVEL
Labs reviewed; notable for decreased NTproBNP and rising Cr. Will inquire re: weights, symptoms. May need to decrease diuretic dose. No

## 2022-05-10 NOTE — ED PROVIDER NOTE - OBJECTIVE STATEMENT
55 yo M PMHx of MARI JONES presents after being found on the ground outside liquor store after leaving the hospital this morning. Patient says he did not drink after leaving the hospital. Says he fell and his L leg hurts. No other complaints. 55 yo M PMHx of R KAREN presents after being found on the ground outside liquor store after leaving the hospital this morning. Patient says he did not drink after leaving the hospital. Says he did not fall and his R leg hurts, below the stump. No CP, SOB, n/v/c/f.

## 2022-05-11 VITALS
HEART RATE: 53 BPM | RESPIRATION RATE: 16 BRPM | SYSTOLIC BLOOD PRESSURE: 113 MMHG | OXYGEN SATURATION: 96 % | TEMPERATURE: 97 F | DIASTOLIC BLOOD PRESSURE: 76 MMHG

## 2022-05-11 RX ORDER — NICOTINE POLACRILEX 2 MG
1 GUM BUCCAL DAILY
Refills: 0 | Status: DISCONTINUED | OUTPATIENT
Start: 2022-05-11 | End: 2022-05-14

## 2022-05-11 RX ADMIN — Medication 650 MILLIGRAM(S): at 00:54

## 2022-05-11 RX ADMIN — Medication 50 MILLIGRAM(S): at 15:07

## 2022-05-11 RX ADMIN — Medication 1 PATCH: at 11:26

## 2022-05-11 RX ADMIN — Medication 50 MILLIGRAM(S): at 07:19

## 2022-05-11 RX ADMIN — Medication 50 MILLIGRAM(S): at 02:41

## 2022-05-11 NOTE — PROVIDER CONTACT NOTE (OTHER) - ACTION/TREATMENT ORDERED:
2 :15 pm PT will evaluate if pt able to ambulate with prosthesis 2 :15 pm PT will evaluate if pt able to ambulate with prosthesis  update - [employer] approved w/c will be delivered to pts bedside ASAP  SW arranged transportation  for p/u to shelter in [facility]

## 2022-05-11 NOTE — PHYSICAL THERAPY INITIAL EVALUATION ADULT - ADDITIONAL COMMENTS
patient reports he has been wheelchair bound for over 1 year, he reports he has had his prosthetic for 1 year, but has not been able to walk with it . he reports it does not fit correctly. upon inspection, patient is missing the pin sleeve which creates the suspension system to lock the prosthetic onto his residual limb, this makes it unable to ambulate

## 2022-05-11 NOTE — PHYSICAL THERAPY INITIAL EVALUATION ADULT - LEVEL OF INDEPENDENCE: GAIT, REHAB EVAL
patient is missing the pin sleeve which creates the suspension system to lock the prosthetic onto his residual limb, this makes it unable to ambulate/unable to perform

## 2022-05-11 NOTE — ED ADULT NURSE REASSESSMENT NOTE - NS ED NURSE REASSESS COMMENT FT1
pt sleeping on stretcher, arousable to tactile stimuli. pt denies any complaints. pt appears in NAD miguel. RR even and unlabored. will continue to monitor.
Received PT in stable condition.  No distress noted.  Right BKA noted.  Resp WDL.  No SOB noted.  Will continue to monitor.
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
## 2022-05-11 NOTE — PROVIDER CONTACT NOTE (OTHER) - RECOMMENDATIONS
CM and SW follow up recommended for assistance with obtaining a new wheelchair, and re-eval of pt's interest in shelter referral.
update - [facility] unable to provide a wheelchair for patient - DME request sent to [employer] [facility] 6  0 - awaiting review -
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
## 2022-05-13 ENCOUNTER — EMERGENCY (EMERGENCY)
Facility: HOSPITAL | Age: 57
LOS: 1 days | Discharge: HOME | End: 2022-05-13
Attending: EMERGENCY MEDICINE | Admitting: EMERGENCY MEDICINE
Payer: MEDICAID

## 2022-05-13 VITALS
OXYGEN SATURATION: 97 % | WEIGHT: 164.91 LBS | DIASTOLIC BLOOD PRESSURE: 117 MMHG | SYSTOLIC BLOOD PRESSURE: 184 MMHG | HEIGHT: 69 IN | TEMPERATURE: 97 F | HEART RATE: 64 BPM | RESPIRATION RATE: 18 BRPM

## 2022-05-13 PROCEDURE — 99284 EMERGENCY DEPT VISIT MOD MDM: CPT

## 2022-05-13 RX ORDER — IBUPROFEN 200 MG
400 TABLET ORAL ONCE
Refills: 0 | Status: COMPLETED | OUTPATIENT
Start: 2022-05-13 | End: 2022-05-13

## 2022-05-13 RX ORDER — LIDOCAINE 4 G/100G
1 CREAM TOPICAL ONCE
Refills: 0 | Status: COMPLETED | OUTPATIENT
Start: 2022-05-13 | End: 2022-05-13

## 2022-05-13 RX ORDER — ACETAMINOPHEN 500 MG
975 TABLET ORAL ONCE
Refills: 0 | Status: COMPLETED | OUTPATIENT
Start: 2022-05-13 | End: 2022-05-13

## 2022-05-13 RX ADMIN — LIDOCAINE 1 PATCH: 4 CREAM TOPICAL at 09:41

## 2022-05-13 RX ADMIN — Medication 975 MILLIGRAM(S): at 09:40

## 2022-05-13 RX ADMIN — Medication 400 MILLIGRAM(S): at 09:40

## 2022-05-13 NOTE — ED PROVIDER NOTE - MUSCULOSKELETAL, MLM
Spine appears normal, range of motion is not limited, +paraspinal tenderness bilateral in lumbar region.

## 2022-05-13 NOTE — ED ADULT NURSE NOTE - HIV OFFER
Patient Information/Instructions    Wound Care After Skin Biopsy    1.  Keep area dry for 24 hours.    2.  After the first 24 hours, gently cleanse the biopsy site 1-2 times each day with soap and water, pat dry and apply ointment (such as polysporin or vaseline) and a new bandage to the area until it is healed.  Keeping the area covered will allow it to heal more quickly than if it is left open to the air.    3.  If stitches have been placed in the site, continue the daily wound care outlined above, until the stitches are removed in our office.    4.  Please call our office immediately if any signs of infection occur at the biopsy site (increased pain, pus or red streaks from the site) or if bleeding or excessive swelling occurs.  A thin pink rim surrounding the site is normal.  We can be reached at [phone].    5.  If bleeding occurs within the first 24 hours after surgery, apply firm pressure and ice to the area for 10-20 minutes.  If this does not stop the bleeding, please call the office.    6.  You will be notified of results by letter or phone call (or at your re-check appointment).  If a month or more has passed since your procedure and you have not received results, please call our office.    Cathy Contreras M.D.  [facility]  Dermatology Department ([phone])  Patient Instructions/Information    Liquid Nitrogen Treatment      Freezing with liquid nitrogen is accompanied by a stinging, burning sensation which usually lasts for ten to fifteen minutes but may persist for several hours.  Tylenol may be used for pain if needed.    A blister usually develops and then forms a dry crust.  The crust should peel off in two to three weeks.  Sometimes the treated area may be a little red after the crust falls off, but this will fade with time.  You should apply the polysporin or vaseline ointment three times a day to the crusted area to speed up healing.  These sample packets are the same as  over-the-counter polysporin.    It is usually best to leave the blister unopened.  However, if the blister is very large, uncomfortable or forms a blood blister, it may be opened with a sterilized needle.    You may take a bath or shower, shampoo your hair or apply cosmetics over the treated area.    If there are any questions or problems, call [facility].   Previously Declined (within the last year)

## 2022-05-13 NOTE — ED PROVIDER NOTE - OBJECTIVE STATEMENT
57 y/o M hx of EtOH abuse, undomiciled, s/p R leg amputation presents with back pain.     Patient was found by bystander on ground reporting lower back pain for a few weeks.  No fevers/chills, urinary incontinence. No specific inciting event. No perineal anesthesia.

## 2022-05-13 NOTE — ED PROVIDER NOTE - CLINICAL SUMMARY MEDICAL DECISION MAKING FREE TEXT BOX
57 y/o M hx of EtOH abuse, undomiciled, s/p R leg amputation presents with back pain.   No neurological deficit; no concerning associated symptoms   Treat symptomatically with analgesics.

## 2022-05-13 NOTE — ED ADULT TRIAGE NOTE - CHIEF COMPLAINT QUOTE
BIBA after bystander called due to patient sleeping on the ground. c/o lower back pain and knee pain. +wheelchair at bedside.

## 2022-05-13 NOTE — ED PROVIDER NOTE - PATIENT PORTAL LINK FT
You can access the FollowMyHealth Patient Portal offered by [facility] by registering at the following website: [email or web address]. By joining [employer]’s FollowMyHealth portal, you will also be able to view your health information using other applications (apps) compatible with our system.

## 2022-05-16 DIAGNOSIS — Z86.69 PERSONAL HISTORY OF OTHER DISEASES OF THE NERVOUS SYSTEM AND SENSE ORGANS: ICD-10-CM

## 2022-05-16 DIAGNOSIS — M54.50 LOW BACK PAIN, UNSPECIFIED: ICD-10-CM

## 2022-05-16 DIAGNOSIS — R56.9 UNSPECIFIED CONVULSIONS: ICD-10-CM

## 2022-05-16 DIAGNOSIS — Z89.511 ACQUIRED ABSENCE OF RIGHT LEG BELOW KNEE: ICD-10-CM

## 2022-05-16 DIAGNOSIS — Z88.0 ALLERGY STATUS TO PENICILLIN: ICD-10-CM

## 2022-06-03 ENCOUNTER — EMERGENCY (EMERGENCY)
Facility: HOSPITAL | Age: 57
LOS: 1 days | Discharge: HOME | End: 2022-06-03
Attending: EMERGENCY MEDICINE | Admitting: EMERGENCY MEDICINE
Payer: MEDICAID

## 2022-06-03 VITALS
RESPIRATION RATE: 17 BRPM | OXYGEN SATURATION: 95 % | DIASTOLIC BLOOD PRESSURE: 78 MMHG | TEMPERATURE: 98 F | SYSTOLIC BLOOD PRESSURE: 111 MMHG | HEART RATE: 59 BPM

## 2022-06-03 VITALS
WEIGHT: 139.99 LBS | RESPIRATION RATE: 16 BRPM | DIASTOLIC BLOOD PRESSURE: 80 MMHG | SYSTOLIC BLOOD PRESSURE: 122 MMHG | OXYGEN SATURATION: 96 % | HEART RATE: 56 BPM | HEIGHT: 69 IN | TEMPERATURE: 98 F

## 2022-06-03 PROCEDURE — 99284 EMERGENCY DEPT VISIT MOD MDM: CPT

## 2022-06-03 NOTE — ED PROVIDER NOTE - PHYSICAL EXAMINATION
VITAL SIGNS: I have reviewed nursing notes and confirm.  CONSTITUTIONAL: lethargic, opens eyes in response to voice, smells of alcohol  HEAD: Normocephalic; atraumatic.  EYES: PERRL  ENT: Airway clear  CARD: RRR  RESP: CTAB  ABD: soft, appears non tender  NEURO: moves all 4 extremities equally  SKIN: no e/o lacerations, abrasions, or ecchymoses

## 2022-06-03 NOTE — ED PROVIDER NOTE - PATIENT PORTAL LINK FT
You can access the FollowMyHealth Patient Portal offered by [facility] by registering at the following website: [email or web address]. By joining [employer]’s FollowMyHealth portal, you will also be able to view your health information using other applications (apps) compatible with our system.
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
## 2022-06-06 DIAGNOSIS — F10.120 ALCOHOL ABUSE WITH INTOXICATION, UNCOMPLICATED: ICD-10-CM

## 2022-06-06 DIAGNOSIS — R41.82 ALTERED MENTAL STATUS, UNSPECIFIED: ICD-10-CM

## 2022-06-06 DIAGNOSIS — Z88.0 ALLERGY STATUS TO PENICILLIN: ICD-10-CM

## 2022-10-15 ENCOUNTER — EMERGENCY (EMERGENCY)
Facility: HOSPITAL | Age: 57
LOS: 1 days | Discharge: HOME | End: 2022-10-15
Attending: EMERGENCY MEDICINE | Admitting: EMERGENCY MEDICINE

## 2022-10-15 VITALS
HEIGHT: 69 IN | HEART RATE: 97 BPM | TEMPERATURE: 98 F | RESPIRATION RATE: 18 BRPM | DIASTOLIC BLOOD PRESSURE: 70 MMHG | OXYGEN SATURATION: 98 % | SYSTOLIC BLOOD PRESSURE: 114 MMHG

## 2022-10-15 PROCEDURE — 99053 MED SERV 10PM-8AM 24 HR FAC: CPT

## 2022-10-15 PROCEDURE — 99283 EMERGENCY DEPT VISIT LOW MDM: CPT

## 2022-10-15 NOTE — ED PROVIDER NOTE - NSFOLLOWUPINSTRUCTIONS_ED_ALL_ED_FT
Please get help for alcohol abuse if you drink heavily or use drugs on a regular basis.     [facility] is a good referral line for crisis and substance abuse help.  [facility] has drop in programs all over the [facility].    Return to the ER for Emergencies.     [facility]: [phone]   [facility] Substance Abuse Services: [phone], option #2   Methadone Maintenance & Ambulatory Opiate Detox: [phone]  [facility]: [phone]  [facility]: [phone]  [facility]: [phone]    [facility]: [phone], option #2   [facility]: [phone]    [facility]: [phone]    [facility] Central Intake: [phone]  [facility] Chemical Dependency/Ancillary Withdrawal: [phone]  [facility] Methadone Maintenance: [phone]    [facility]: [phone]  [facility] Addiction Treatment Services: [phone]    [facility]: 5-955-7-[facility] Office of Alcoholism and Substance Abuse Services (OASAS): https://www.oasas.[place].gov/providerdirectory/  [facility] for Addiction Services and Psychotherapy Interventions Research (CASPIR)  www.[facility] Hospital[facility]ny.org     Interested in discussing options to reduce your tobacco use?    [facility] for Tobacco Control:  [phone]  [facility] QUITLINE: 7-807-ZZ-QUITS ([phone])    Interested in learning more about substance use?      http://rethinkingdrinking.niaaa.nih.gov   https://www.drugabuse.gov/patients-families     Learn more about opioid overdose prevention programs in [place]:  http://www.health.[place].gov/diseases/aids/general/opioid_overdose_prevention/

## 2022-10-15 NOTE — ED PROVIDER NOTE - PHYSICAL EXAMINATION
CONSTITUTIONAL: Well-appearing; well-nourished; in no apparent distress. Poor hygiene.   EYES:  clear bilaterally  ENT: airway patent  Resp breathing comfortably with no distress  MSK: No signs of acute trauma or injury. R leg amputated.   PSYCHOLOGICAL: The patient’s mood and manner are appropriate.
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
## 2022-10-15 NOTE — ED ADULT NURSE NOTE - OBJECTIVE STATEMENT
male patient bib [facility] ems for eval. patient is undomiciled appears soiled. MD aware/ pending eval. denies medical complaint.

## 2022-10-15 NOTE — ED PROVIDER NOTE - PATIENT PORTAL LINK FT
You can access the FollowMyHealth Patient Portal offered by [facility] by registering at the following website: [email or web address]. By joining [employer]’s FollowMyHealth portal, you will also be able to view your health information using other applications (apps) compatible with our system.

## 2022-10-15 NOTE — ED PROVIDER NOTE - OBJECTIVE STATEMENT
56-year-old man with a history of alcohol abuse homelessness and leg amputation is brought in by EMS for being cold.  He has no other acute complaints and would like a warm cup of coffee.  He would also like a dry pair of pants because he has urinated on himself repeatedly.  He is known to us for previous frequent ED visits but has not been here in some time.  There are no signs of acute trauma and he does not appear grossly intoxicated at this time. Temperatures are in the 60's this evening.

## 2022-10-15 NOTE — ED PROVIDER NOTE - CLINICAL SUMMARY MEDICAL DECISION MAKING FREE TEXT BOX
Fall
Patient is a homeless patient brought in by EMS with no acute complaints aside from wanting a cup of coffee and a dry pair of pants.  He was feeling cold as he had urinated on himself outside.  Patient was given a cup of coffee will give dry pants and plan to discharge.

## 2022-10-17 DIAGNOSIS — Z89.511 ACQUIRED ABSENCE OF RIGHT LEG BELOW KNEE: ICD-10-CM

## 2022-10-17 DIAGNOSIS — F10.10 ALCOHOL ABUSE, UNCOMPLICATED: ICD-10-CM

## 2022-10-17 DIAGNOSIS — R68.89 OTHER GENERAL SYMPTOMS AND SIGNS: ICD-10-CM

## 2022-10-17 DIAGNOSIS — Z59.00 HOMELESSNESS UNSPECIFIED: ICD-10-CM

## 2022-10-17 DIAGNOSIS — Z88.0 ALLERGY STATUS TO PENICILLIN: ICD-10-CM

## 2022-10-17 SDOH — ECONOMIC STABILITY - HOUSING INSECURITY: HOMELESSNESS UNSPECIFIED: Z59.00

## 2023-01-04 NOTE — ED PROVIDER NOTE - NSCAREINITIATED _GEN_ER
Yariel Jacob) [No] : No [Medication education provided] : Medication education provided. [Rationale for medication choices, possible risks/precautions, benefits, alternative treatment choices, and consequences of non-treatment discussed] : Rationale for medication choices, possible risks/precautions, benefits, alternative treatment choices, and consequences of non-treatment discussed with patient/family/caregiver  [FreeTextEntry5] : -psychoeducation about diagnosis and treatment modalities \par - resources: iocdf.org\par -Continue ERP for OCD \par -Lab/other tests: appreciate coordination of care with PMD, TSH screen wnl \par -Medication:  Sertraline increase to 75mg \par -Safety:Educated patient of importance of remaining abstinent from drugs and alcohol; Emergency procedures were discussed\par -Patient given opportunity to ask questions and their questions were answered and they expressed understanding and agreement with above plan.\par -Follow up: 1 months \par

## 2023-01-15 ENCOUNTER — EMERGENCY (EMERGENCY)
Facility: HOSPITAL | Age: 58
LOS: 1 days | Discharge: HOME | End: 2023-01-15
Attending: EMERGENCY MEDICINE | Admitting: EMERGENCY MEDICINE
Payer: MEDICAID

## 2023-01-15 VITALS
OXYGEN SATURATION: 94 % | DIASTOLIC BLOOD PRESSURE: 70 MMHG | TEMPERATURE: 98 F | RESPIRATION RATE: 17 BRPM | HEART RATE: 118 BPM | SYSTOLIC BLOOD PRESSURE: 105 MMHG

## 2023-01-15 VITALS
TEMPERATURE: 98 F | HEIGHT: 66 IN | OXYGEN SATURATION: 98 % | DIASTOLIC BLOOD PRESSURE: 79 MMHG | HEART RATE: 101 BPM | WEIGHT: 139.99 LBS | RESPIRATION RATE: 16 BRPM | SYSTOLIC BLOOD PRESSURE: 114 MMHG

## 2023-01-15 DIAGNOSIS — T87.89 OTHER COMPLICATIONS OF AMPUTATION STUMP: ICD-10-CM
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
## 2023-01-15 DIAGNOSIS — Z89.511 ACQUIRED ABSENCE OF RIGHT LEG BELOW KNEE: ICD-10-CM
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
## 2023-01-15 DIAGNOSIS — S80.811A ABRASION, RIGHT LOWER LEG, INITIAL ENCOUNTER: ICD-10-CM

## 2023-01-15 LAB
ALBUMIN SERPL ELPH-MCNC: 4.2 G/DL — SIGNIFICANT CHANGE UP (ref 3.4–5)
ALP SERPL-CCNC: 114 U/L — SIGNIFICANT CHANGE UP (ref 40–120)
ALT FLD-CCNC: 21 U/L — SIGNIFICANT CHANGE UP (ref 12–42)
ANION GAP SERPL CALC-SCNC: 15 MMOL/L — SIGNIFICANT CHANGE UP (ref 9–16)
APTT BLD: 33.8 SEC — SIGNIFICANT CHANGE UP (ref 27.5–35.5)
AST SERPL-CCNC: 23 U/L — SIGNIFICANT CHANGE UP (ref 15–37)
BASOPHILS # BLD AUTO: 0.05 K/UL — SIGNIFICANT CHANGE UP (ref 0–0.2)
BASOPHILS NFR BLD AUTO: 0.4 % — SIGNIFICANT CHANGE UP (ref 0–2)
BILIRUB SERPL-MCNC: 0.5 MG/DL — SIGNIFICANT CHANGE UP (ref 0.2–1.2)
BUN SERPL-MCNC: 9 MG/DL — SIGNIFICANT CHANGE UP (ref 7–23)
CALCIUM SERPL-MCNC: 9.5 MG/DL — SIGNIFICANT CHANGE UP (ref 8.5–10.5)
CHLORIDE SERPL-SCNC: 103 MMOL/L — SIGNIFICANT CHANGE UP (ref 96–108)
CK SERPL-CCNC: 117 U/L — SIGNIFICANT CHANGE UP (ref 39–308)
CO2 SERPL-SCNC: 26 MMOL/L — SIGNIFICANT CHANGE UP (ref 22–31)
CREAT SERPL-MCNC: 0.74 MG/DL — SIGNIFICANT CHANGE UP (ref 0.5–1.3)
EGFR: 107 ML/MIN/1.73M2 — SIGNIFICANT CHANGE UP
EOSINOPHIL # BLD AUTO: 0.07 K/UL — SIGNIFICANT CHANGE UP (ref 0–0.5)
EOSINOPHIL NFR BLD AUTO: 0.6 % — SIGNIFICANT CHANGE UP (ref 0–6)
ETHANOL SERPL-MCNC: 162 MG/DL — HIGH
GLUCOSE SERPL-MCNC: 155 MG/DL — HIGH (ref 70–99)
HCT VFR BLD CALC: 47.6 % — SIGNIFICANT CHANGE UP (ref 39–50)
HGB BLD-MCNC: 15.4 G/DL — SIGNIFICANT CHANGE UP (ref 13–17)
IMM GRANULOCYTES NFR BLD AUTO: 0.3 % — SIGNIFICANT CHANGE UP (ref 0–0.9)
INR BLD: 1.03 — SIGNIFICANT CHANGE UP (ref 0.88–1.16)
LYMPHOCYTES # BLD AUTO: 2.46 K/UL — SIGNIFICANT CHANGE UP (ref 1–3.3)
LYMPHOCYTES # BLD AUTO: 22.1 % — SIGNIFICANT CHANGE UP (ref 13–44)
MAGNESIUM SERPL-MCNC: 2.2 MG/DL — SIGNIFICANT CHANGE UP (ref 1.6–2.6)
MCHC RBC-ENTMCNC: 26.3 PG — LOW (ref 27–34)
MCHC RBC-ENTMCNC: 32.4 GM/DL — SIGNIFICANT CHANGE UP (ref 32–36)
MCV RBC AUTO: 81.2 FL — SIGNIFICANT CHANGE UP (ref 80–100)
MONOCYTES # BLD AUTO: 0.54 K/UL — SIGNIFICANT CHANGE UP (ref 0–0.9)
MONOCYTES NFR BLD AUTO: 4.8 % — SIGNIFICANT CHANGE UP (ref 2–14)
NEUTROPHILS # BLD AUTO: 8 K/UL — HIGH (ref 1.8–7.4)
NEUTROPHILS NFR BLD AUTO: 71.8 % — SIGNIFICANT CHANGE UP (ref 43–77)
NRBC # BLD: 0 /100 WBCS — SIGNIFICANT CHANGE UP (ref 0–0)
NT-PROBNP SERPL-SCNC: 41 PG/ML — SIGNIFICANT CHANGE UP
OB PNL STL: NEGATIVE — SIGNIFICANT CHANGE UP
PLATELET # BLD AUTO: 327 K/UL — SIGNIFICANT CHANGE UP (ref 150–400)
POTASSIUM SERPL-MCNC: 3.6 MMOL/L — SIGNIFICANT CHANGE UP (ref 3.5–5.3)
POTASSIUM SERPL-SCNC: 3.6 MMOL/L — SIGNIFICANT CHANGE UP (ref 3.5–5.3)
PROT SERPL-MCNC: 9 G/DL — HIGH (ref 6.4–8.2)
PROTHROM AB SERPL-ACNC: 12 SEC — SIGNIFICANT CHANGE UP (ref 10.5–13.4)
RBC # BLD: 5.86 M/UL — HIGH (ref 4.2–5.8)
RBC # FLD: 13.2 % — SIGNIFICANT CHANGE UP (ref 10.3–14.5)
SODIUM SERPL-SCNC: 144 MMOL/L — SIGNIFICANT CHANGE UP (ref 132–145)
TROPONIN I, HIGH SENSITIVITY RESULT: 6.8 NG/L — SIGNIFICANT CHANGE UP
WBC # BLD: 11.15 K/UL — HIGH (ref 3.8–10.5)
WBC # FLD AUTO: 11.15 K/UL — HIGH (ref 3.8–10.5)

## 2023-01-15 PROCEDURE — 99284 EMERGENCY DEPT VISIT MOD MDM: CPT

## 2023-01-15 NOTE — ED PROVIDER NOTE - GENITOURINARY, MLM
No discharge, lesions. Hypothermia no visible erythema, crepitus, stool is normal color, guaiac negative, no streaking or tenderness palpation in the perineal area.
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
## 2023-01-15 NOTE — ED ADULT TRIAGE NOTE - CHIEF COMPLAINT QUOTE
Pt brought in by EMS for complaint of weakness and rectal bleeding. Pt reports bleeding started today. Denies any abdominal pain, nausea or vomiting.
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
## 2023-01-15 NOTE — ED PROVIDER NOTE - CLINICAL SUMMARY MEDICAL DECISION MAKING FREE TEXT BOX
Patient presents with low core rectal temp, to be placed on benjamin hugger, to check alcohol, electrolytes, and basic labs. patient has no signs of injury, resting comfortably, likely acute etoh intox. r bka stump with no signs of cellulitis, small abrasions noted, and patient cleaned up, with no rectal bleeding, guiac neg. recheck rectal temps, reassess.

## 2023-01-15 NOTE — ED ADULT NURSE NOTE - NSIMPLEMENTINTERV_GEN_ALL_ED
Implemented All Fall Risk Interventions:  [place] to call system. Call bell, personal items and telephone within reach. Instruct patient to call for assistance. Room bathroom lighting operational. Non-slip footwear when patient is off stretcher. Physically safe environment: no spills, clutter or unnecessary equipment. Stretcher in lowest position, wheels locked, appropriate side rails in place. Provide visual cue, wrist band, yellow gown, etc. Monitor gait and stability. Monitor for mental status changes and reorient to person, place, and time. Review medications for side effects contributing to fall risk. Reinforce activity limits and safety measures with patient and family.

## 2023-01-15 NOTE — ED PROVIDER NOTE - CARE PLAN
Principal Discharge DX:	Hypothermia due to non-environmental cause  Secondary Diagnosis:	Acute alcohol intoxication   1

## 2023-01-15 NOTE — ED PROVIDER NOTE - UNABLE TO OBTAIN
Patient arrives altered unable to provide history or cooperate with physical exam Urgent need for Intervention

## 2023-01-17 DIAGNOSIS — Z59.00 HOMELESSNESS UNSPECIFIED: ICD-10-CM

## 2023-01-17 DIAGNOSIS — R68.0 HYPOTHERMIA, NOT ASSOCIATED WITH LOW ENVIRONMENTAL TEMPERATURE: ICD-10-CM

## 2023-01-17 DIAGNOSIS — Z99.3 DEPENDENCE ON WHEELCHAIR: ICD-10-CM

## 2023-01-17 DIAGNOSIS — F10.129 ALCOHOL ABUSE WITH INTOXICATION, UNSPECIFIED: ICD-10-CM

## 2023-01-17 DIAGNOSIS — Z88.0 ALLERGY STATUS TO PENICILLIN: ICD-10-CM

## 2023-01-17 SDOH — ECONOMIC STABILITY - HOUSING INSECURITY: HOMELESSNESS UNSPECIFIED: Z59.00

## 2023-04-27 NOTE — ED ADULT NURSE REASSESSMENT NOTE - NS ED NURSE REASSESS COMMENT FT1
patient refused all medical intervention including nursing assessment. patient refused to be changed into paper scrubs; MD made aware. D/C. left ED in wheel chair. alert verbal oriented x3 able to make needs known Doxepin Counseling:  Patient advised that the medication is sedating and not to drive a car after taking this medication. Patient informed of potential adverse effects including but not limited to dry mouth, urinary retention, and blurry vision.  The patient verbalized understanding of the proper use and possible adverse effects of doxepin.  All of the patient's questions and concerns were addressed.

## 2023-05-21 ENCOUNTER — EMERGENCY (EMERGENCY)
Facility: HOSPITAL | Age: 58
LOS: 1 days | Discharge: HOME | End: 2023-05-21
Attending: EMERGENCY MEDICINE | Admitting: EMERGENCY MEDICINE
Payer: MEDICAID

## 2023-05-21 VITALS
TEMPERATURE: 98 F | WEIGHT: 119.93 LBS | HEART RATE: 64 BPM | SYSTOLIC BLOOD PRESSURE: 104 MMHG | DIASTOLIC BLOOD PRESSURE: 63 MMHG | RESPIRATION RATE: 18 BRPM | OXYGEN SATURATION: 96 %

## 2023-05-21 DIAGNOSIS — Z59.00 HOMELESSNESS UNSPECIFIED: ICD-10-CM

## 2023-05-21 DIAGNOSIS — R41.82 ALTERED MENTAL STATUS, UNSPECIFIED: ICD-10-CM

## 2023-05-21 DIAGNOSIS — Z99.3 DEPENDENCE ON WHEELCHAIR: ICD-10-CM

## 2023-05-21 DIAGNOSIS — Z88.0 ALLERGY STATUS TO PENICILLIN: ICD-10-CM

## 2023-05-21 DIAGNOSIS — Z00.00 ENCOUNTER FOR GENERAL ADULT MEDICAL EXAMINATION WITHOUT ABNORMAL FINDINGS: ICD-10-CM

## 2023-05-21 PROCEDURE — 99283 EMERGENCY DEPT VISIT LOW MDM: CPT

## 2023-05-21 SDOH — ECONOMIC STABILITY - HOUSING INSECURITY: HOMELESSNESS UNSPECIFIED: Z59.00
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
## 2023-05-21 NOTE — ED PROVIDER NOTE - PATIENT PORTAL LINK FT
You can access the FollowMyHealth Patient Portal offered by [facility] by registering at the following website: [email or web address]. By joining [employer]’s FollowMyHealth portal, you will also be able to view your health information using other applications (apps) compatible with our system.
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
## 2023-05-21 NOTE — ED PROVIDER NOTE - CLINICAL SUMMARY MEDICAL DECISION MAKING FREE TEXT BOX
Pt currently w no medical complains. Pt is undomiciled and has shows aggressive behavior towards staff. Medical screening examination shows no emergent medical conditions and pt has no medical complains at this time.
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
## 2023-05-21 NOTE — ED ADULT NURSE NOTE - NSFALLRISKINTERV_ED_ALL_ED

## 2023-05-23 ENCOUNTER — EMERGENCY (EMERGENCY)
Facility: HOSPITAL | Age: 58
LOS: 1 days | Discharge: HOME | End: 2023-05-23
Admitting: EMERGENCY MEDICINE
Payer: MEDICAID

## 2023-05-23 VITALS
DIASTOLIC BLOOD PRESSURE: 83 MMHG | HEIGHT: 68 IN | SYSTOLIC BLOOD PRESSURE: 120 MMHG | OXYGEN SATURATION: 98 % | HEART RATE: 70 BPM | WEIGHT: 167.55 LBS | TEMPERATURE: 98 F | RESPIRATION RATE: 15 BRPM

## 2023-05-23 VITALS
OXYGEN SATURATION: 98 % | TEMPERATURE: 98 F | DIASTOLIC BLOOD PRESSURE: 78 MMHG | SYSTOLIC BLOOD PRESSURE: 118 MMHG | HEART RATE: 67 BPM | RESPIRATION RATE: 16 BRPM

## 2023-05-23 LAB
ANION GAP SERPL CALC-SCNC: 7 MMOL/L — LOW (ref 9–16)
BASOPHILS # BLD AUTO: 0.02 K/UL — SIGNIFICANT CHANGE UP (ref 0–0.2)
BASOPHILS NFR BLD AUTO: 0.4 % — SIGNIFICANT CHANGE UP (ref 0–2)
BUN SERPL-MCNC: 12 MG/DL — SIGNIFICANT CHANGE UP (ref 7–23)
CALCIUM SERPL-MCNC: 8.4 MG/DL — LOW (ref 8.5–10.5)
CHLORIDE SERPL-SCNC: 105 MMOL/L — SIGNIFICANT CHANGE UP (ref 96–108)
CO2 SERPL-SCNC: 29 MMOL/L — SIGNIFICANT CHANGE UP (ref 22–31)
CREAT SERPL-MCNC: 0.66 MG/DL — SIGNIFICANT CHANGE UP (ref 0.5–1.3)
EGFR: 111 ML/MIN/1.73M2 — SIGNIFICANT CHANGE UP
EOSINOPHIL # BLD AUTO: 0.08 K/UL — SIGNIFICANT CHANGE UP (ref 0–0.5)
EOSINOPHIL NFR BLD AUTO: 1.5 % — SIGNIFICANT CHANGE UP (ref 0–6)
GLUCOSE SERPL-MCNC: 104 MG/DL — HIGH (ref 70–99)
HCT VFR BLD CALC: 40.1 % — SIGNIFICANT CHANGE UP (ref 39–50)
HGB BLD-MCNC: 13.1 G/DL — SIGNIFICANT CHANGE UP (ref 13–17)
IMM GRANULOCYTES NFR BLD AUTO: 0.2 % — SIGNIFICANT CHANGE UP (ref 0–0.9)
LYMPHOCYTES # BLD AUTO: 1.64 K/UL — SIGNIFICANT CHANGE UP (ref 1–3.3)
LYMPHOCYTES # BLD AUTO: 30 % — SIGNIFICANT CHANGE UP (ref 13–44)
MCHC RBC-ENTMCNC: 27.9 PG — SIGNIFICANT CHANGE UP (ref 27–34)
MCHC RBC-ENTMCNC: 32.7 GM/DL — SIGNIFICANT CHANGE UP (ref 32–36)
MCV RBC AUTO: 85.3 FL — SIGNIFICANT CHANGE UP (ref 80–100)
MONOCYTES # BLD AUTO: 0.34 K/UL — SIGNIFICANT CHANGE UP (ref 0–0.9)
MONOCYTES NFR BLD AUTO: 6.2 % — SIGNIFICANT CHANGE UP (ref 2–14)
NEUTROPHILS # BLD AUTO: 3.37 K/UL — SIGNIFICANT CHANGE UP (ref 1.8–7.4)
NEUTROPHILS NFR BLD AUTO: 61.7 % — SIGNIFICANT CHANGE UP (ref 43–77)
NRBC # BLD: 0 /100 WBCS — SIGNIFICANT CHANGE UP (ref 0–0)
PLATELET # BLD AUTO: 186 K/UL — SIGNIFICANT CHANGE UP (ref 150–400)
POTASSIUM SERPL-MCNC: 4.2 MMOL/L — SIGNIFICANT CHANGE UP (ref 3.5–5.3)
POTASSIUM SERPL-SCNC: 4.2 MMOL/L — SIGNIFICANT CHANGE UP (ref 3.5–5.3)
RBC # BLD: 4.7 M/UL — SIGNIFICANT CHANGE UP (ref 4.2–5.8)
RBC # FLD: 15 % — HIGH (ref 10.3–14.5)
SODIUM SERPL-SCNC: 141 MMOL/L — SIGNIFICANT CHANGE UP (ref 132–145)
TROPONIN I, HIGH SENSITIVITY RESULT: 6.1 NG/L — SIGNIFICANT CHANGE UP
WBC # BLD: 5.46 K/UL — SIGNIFICANT CHANGE UP (ref 3.8–10.5)
WBC # FLD AUTO: 5.46 K/UL — SIGNIFICANT CHANGE UP (ref 3.8–10.5)

## 2023-05-23 PROCEDURE — 99285 EMERGENCY DEPT VISIT HI MDM: CPT

## 2023-05-23 PROCEDURE — 71045 X-RAY EXAM CHEST 1 VIEW: CPT | Mod: 26

## 2023-05-23 NOTE — ED PROVIDER NOTE - CLINICAL SUMMARY MEDICAL DECISION MAKING FREE TEXT BOX
pt presents with c/o chest pain for several days. noted to be wearing hospital bracelets from 2 other hospitals with visits from 5/17 which he states was also for chest pain. no exacerbating or alleviating factors. states pain is constant so single trop used for rule out. labs nonacute. trop negative. cxr with cardiomegaly.

## 2023-05-23 NOTE — ED PROVIDER NOTE - PATIENT PORTAL LINK FT
You can access the FollowMyHealth Patient Portal offered by [facility] by registering at the following website: [email or web address]. By joining [employer]’s FollowMyHealth portal, you will also be able to view your health information using other applications (apps) compatible with our system.

## 2023-05-23 NOTE — ED ADULT NURSE NOTE - HIV OFFER
Heart muscle is a little weak and shows evidence of prior heart attacks. Heart valves all ok.  No surprise, this is what I would have expected
Previously Declined (within the last year)

## 2023-05-23 NOTE — ED ADULT NURSE NOTE - NSFALLRISKINTERV_ED_ALL_ED

## 2023-05-23 NOTE — ED PROVIDER NOTE - OBJECTIVE STATEMENT
58 y/o M cigarette smoker with PMHx of right BKA, wheelchair dependent, homelessness, and alcoholism, presents to ED c/o constant midsternal CP with no modifying factors x1 week. Pt was given ASA by EMS prior to arrival. He was seen elsewhere 6 days ago for the same complaint with negative workup. Denies fevers/chills, N/V/D, SOB, or cough. Further denies drug use. Pt was last seen in this ED 2 days ago for AMS.
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
## 2023-05-23 NOTE — ED ADULT NURSE NOTE - CAS DISCH TRANSFER METHOD
Detail Level: Detailed Cryotherapy Text: The wound bed was treated with cryotherapy after the biopsy was performed. Billing Type: Third-Party Bill Silver Nitrate Text: The wound bed was treated with silver nitrate after the biopsy was performed. Electrodesiccation And Curettage Text: The wound bed was treated with electrodesiccation and curettage after the biopsy was performed. Anesthesia Type: 1% lidocaine with epinephrine Biopsy Type: H and E Body Location Override (Optional - Billing Will Still Be Based On Selected Body Map Location If Applicable): right inferior midline midback Destruction After The Procedure: No Biopsy Method: Double edge Personna blades Consent: Written consent was obtained and risks were reviewed including but not limited to scarring, infection, bleeding, scabbing, incomplete removal, nerve damage and allergy to anesthesia. Hemostasis: Aluminum Chloride Anesthesia Volume In Cc: 0.5 Wound Care: Bacitracin Additional Anesthesia Volume In Cc (Will Not Render If 0): 0 Dressing: bandage Notification Instructions: Patient will be notified of biopsy results. However, patient instructed to call the office if not contacted within 2 weeks. Electrodesiccation Text: The wound bed was treated with electrodesiccation after the biopsy was performed. Post-Care Instructions: I reviewed with the patient in detail post-care instructions. Patient is to keep the biopsy site dry overnight, and then apply bacitracin twice daily until healed. Patient may apply hydrogen peroxide soaks to remove any crusting. Body Location Override (Optional - Billing Will Still Be Based On Selected Body Map Location If Applicable): left mid upper back Size Of Lesion In Cm: 0.6 Size Of Lesion In Cm: 0.4 Type Of Destruction Used: Curettage Other

## 2023-05-26 DIAGNOSIS — Z59.00 HOMELESSNESS UNSPECIFIED: ICD-10-CM

## 2023-05-26 DIAGNOSIS — R00.1 BRADYCARDIA, UNSPECIFIED: ICD-10-CM

## 2023-05-26 DIAGNOSIS — R07.89 OTHER CHEST PAIN: ICD-10-CM

## 2023-05-26 DIAGNOSIS — Z99.3 DEPENDENCE ON WHEELCHAIR: ICD-10-CM

## 2023-05-26 DIAGNOSIS — Z86.69 PERSONAL HISTORY OF OTHER DISEASES OF THE NERVOUS SYSTEM AND SENSE ORGANS: ICD-10-CM

## 2023-05-26 DIAGNOSIS — Z88.0 ALLERGY STATUS TO PENICILLIN: ICD-10-CM

## 2023-05-26 DIAGNOSIS — F17.210 NICOTINE DEPENDENCE, CIGARETTES, UNCOMPLICATED: ICD-10-CM

## 2023-05-26 DIAGNOSIS — Z89.511 ACQUIRED ABSENCE OF RIGHT LEG BELOW KNEE: ICD-10-CM

## 2023-05-26 SDOH — ECONOMIC STABILITY - HOUSING INSECURITY: HOMELESSNESS UNSPECIFIED: Z59.00

## 2023-05-28 ENCOUNTER — EMERGENCY (EMERGENCY)
Facility: HOSPITAL | Age: 58
LOS: 1 days | Discharge: HOME | End: 2023-05-28
Admitting: EMERGENCY MEDICINE
Payer: MEDICAID

## 2023-05-28 VITALS
RESPIRATION RATE: 17 BRPM | DIASTOLIC BLOOD PRESSURE: 89 MMHG | HEART RATE: 95 BPM | HEIGHT: 68 IN | WEIGHT: 139.99 LBS | SYSTOLIC BLOOD PRESSURE: 102 MMHG | OXYGEN SATURATION: 98 % | TEMPERATURE: 98 F

## 2023-05-28 PROCEDURE — 99283 EMERGENCY DEPT VISIT LOW MDM: CPT

## 2023-05-28 NOTE — ED ADULT NURSE NOTE - NSFALLRISKINTERV_ED_ALL_ED
Assistance OOB with selected safe patient handling equipment if applicable/Assistance with ambulation/Communicate fall risk and risk factors to all staff, patient, and family/Monitor gait and stability/Monitor for mental status changes and reorient to person, place, and time, as needed/Provide visual cue: yellow wristband, yellow gown, etc/Reinforce activity limits and safety measures with patient and family/Toileting schedule using arm’s reach rule for commode and bathroom/Use of alarms - bed, stretcher, chair and/or video monitoring/Call bell, personal items and telephone in reach/Instruct patient to call for assistance before getting out of bed/chair/stretcher/Non-slip footwear applied when patient is off stretcher/[place] to call system/Physically safe environment - no spills, clutter or unnecessary equipment/Purposeful Proactive Rounding/Room/bathroom lighting operational, light cord in reach Assistance OOB with selected safe patient handling equipment if applicable/Assistance with ambulation/Communicate fall risk and risk factors to all staff, patient, and family/Monitor gait and stability/Monitor for mental status changes and reorient to person, place, and time, as needed/Provide patient with walking aids/Provide visual cue: yellow wristband, yellow gown, etc/Reinforce activity limits and safety measures with patient and family/Toileting schedule using arm’s reach rule for commode and bathroom/Use of alarms - bed, stretcher, chair and/or video monitoring/Call bell, personal items and telephone in reach/Instruct patient to call for assistance before getting out of bed/chair/stretcher/Non-slip footwear applied when patient is off stretcher/[place] to call system/Physically safe environment - no spills, clutter or unnecessary equipment/Purposeful Proactive Rounding/Room/bathroom lighting operational, light cord in reach

## 2023-05-28 NOTE — ED PROVIDER NOTE - OBJECTIVE STATEMENT
58 yo m well appearing chronic alcohol user pw c/o etoh withdrawal, when asked about the symptoms of etoh withdrawal pt states that he has cravings and when pt is challenged that this does not constitute ETOH withdrawal since he is otherwise asymptomatic w/o tremors or htn/tachcycardia pt becomes aggressive and curses at ed staff.    I have reviewed available current nursing and previous documentation of past medical, surgical, family, and/or social history. 56 yo m ams, possibly 2/2 substance, no trauma noted or reported, protecting airway, will monitor for safety and reassessment for mental status    I have reviewed available current nursing and previous documentation of past medical, surgical, family, and/or social history.

## 2023-05-28 NOTE — ED ADULT NURSE NOTE - OBJECTIVE STATEMENT
BIBA for alcohol intoxication, admits to etoh. pt in NAD and will continue to monitor. fall precautions in place, bed alarm on, close to nurses station BIBA for alcohol intoxication, admits to etoh. pt in NAD and will continue to monitor. fall precautions in place, bed alarm on, close to nurses station. has wheelchair at bedside

## 2023-05-28 NOTE — ED PROVIDER NOTE - NS ED ROS FT
Review of Systems    Constitutional: (-) fever (-) weakness (-) diaphoresis   Eyes: (-) change in vision (-) eye pain  ENT: (-) sore throat (-) ear ache (-) nasal discharge  Cardiovascular: (-) chest pain  (-) palpitations  Respiratory: (-) SOB (-) cough   GI: (-) abdominal pain (-) N/V (-) diarrhea  Integumentary: (-) rash (-) redness   Neurological:  (-) focal deficit (-) altered mental status intox unable to assess

## 2023-05-28 NOTE — ED PROVIDER NOTE - PATIENT PORTAL LINK FT
You can access the FollowMyHealth Patient Portal offered by [facility] by registering at the following website: [email or web address]. By joining [employer]’s FollowMyHealth portal, you will also be able to view your health information using other applications (apps) compatible with our system. You can access the FollowMyHealth Patient Portal offered by [facility] by registering at the following website: [email or web address]. By joining [email or web address]’s FollowMyHealth portal, you will also be able to view your health information using other applications (apps) compatible with our system.

## 2023-05-28 NOTE — ED ADULT NURSE REASSESSMENT NOTE - NS ED NURSE REASSESS COMMENT FT1
Assumed care from MAXWELL Hemphill @ 2000; asleep on stretcher in NAD. All safety precautions maintained. Awaiting sobriety.

## 2023-05-28 NOTE — ED PROVIDER NOTE - PHYSICAL EXAMINATION
Physical Exam    Vital Signs: I have reviewed the initial vital signs.  Constitutional: well-appearing, appears stated age  Eyes: PERRLA, EOM intact, RAPD absent, and symmetrical lids.  ENT: neck supple with no adenopathy, moist MM.  Cardiovascular: +S1/S2, no murmurs, regular rate, regular rhythm, well-perfused extremities  Respiratory: unlabored respiratory effort, speaks in full sentences CON: somnolent, arousable to loud verbal or painful stimuli,   HENMT: no pooling of secretion, protecting airway, no facial ecchymosis or evidence of trauma, perrl  HEAD: no obvious hematoma or laceration,   CV: rrr,   RESP: chest rise, breathing spontaneously,   GI: soft,   SKIN: no laceration or abrasion noted,   MSK: no deformity noted,   NEURO: limited exam 2/2 mental status

## 2023-05-28 NOTE — ED PROVIDER NOTE - CLINICAL SUMMARY MEDICAL DECISION MAKING FREE TEXT BOX
58 yo m well appearing chronic alcohol user pw c/o etoh withdrawal, when asked about the symptoms of etoh withdrawal pt states that he has cravings and when pt is challenged that this does not constitute ETOH withdrawal since he is otherwise asymptomatic w/o tremors or htn/tachcycardia pt becomes aggressive and curses at ed staff. ams, possibly 2/2 substance, no trauma noted or reported, protecting airway, will monitor for safety and reassessment for mental status

## 2023-05-31 DIAGNOSIS — Z88.0 ALLERGY STATUS TO PENICILLIN: ICD-10-CM

## 2023-05-31 DIAGNOSIS — F10.229 ALCOHOL DEPENDENCE WITH INTOXICATION, UNSPECIFIED: ICD-10-CM

## 2023-05-31 DIAGNOSIS — R41.82 ALTERED MENTAL STATUS, UNSPECIFIED: ICD-10-CM

## 2023-05-31 DIAGNOSIS — F10.239 ALCOHOL DEPENDENCE WITH WITHDRAWAL, UNSPECIFIED: ICD-10-CM

## 2023-07-09 NOTE — ED PROVIDER NOTE - CALF TENDERNESS
Chronic right BKA with stump intact, superficial abrasion noted to the tip of the distal stump, region was cleaned up there are no lacerations no abrasions otherwise no streaking no fluctuance or induration to stump.  Sensation intact./none The patient discussed with ACP    Kamlesh BARRAZA, Obie Curtis, performed the initial face to face bedside interview with this patient regarding history of present illness, review of symptoms and relevant past medical, social and family history.  I completed an independent physical examination.  I was the initial provider who evaluated this patient. I have signed out the follow up of any pending tests (i.e. labs, radiological studies) to the ACP.  I have communicated the patient’s plan of care and disposition with the ACP.

## 2023-07-24 NOTE — ED ADULT TRIAGE NOTE - BP NONINVASIVE SYSTOLIC (MM HG)
120 Cyclophosphamide Pregnancy And Lactation Text: This medication is Pregnancy Category D and it isn't considered safe during pregnancy. This medication is excreted in breast milk.

## 2024-09-24 NOTE — ED PROVIDER NOTE - PSYCHIATRIC, MLM
Patient attended Phase 2 Cardiac Rehab Exercise Session. Further documentation will be scanned into the medical record upon discharge.     Altered mental status

## 2024-12-16 NOTE — ED ADULT TRIAGE NOTE - ESI TRIAGE ACUITY LEVEL, MLM
Last Office Visit: 7/10/24 Provider: ANDREW  **Is provider OOT? No    Next Office Visit: 7/2/25 Provider: ANDREW  **If no OV, when does pt need to be seen? N/a  **Has patient already had 30 day supply? No    Lab orders needed? no   Encounter provider correct? Yes If not, change provider  Script changes since last refill? no    LAST LABS:   CMP:   Lab Results   Component Value Date     07/24/2024    K 3.8 07/24/2024     07/24/2024    CO2 30 07/24/2024    BUN 18 07/24/2024    CREATININE 0.8 07/24/2024    GLUCOSE 136 (H) 07/24/2024    CALCIUM 9.0 07/24/2024    BILITOT 0.8 07/24/2024    ALKPHOS 111 07/24/2024    AST 11 (L) 07/24/2024    ALT 7 (L) 07/24/2024    LABGLOM >90 07/24/2024    GFRAA >60 04/07/2022    AGRATIO 1.4 07/24/2024    GLOB 3.7 01/16/2019     **Care Everywhere? N/A        3

## 2025-01-02 NOTE — ED PROVIDER NOTE - GASTROINTESTINAL NEGATIVE STATEMENT, MLM
Charting  Patient Has given consent to record this visit or for the use of AI to transcribe their visit in real time, for documentation in their clinical record.     no abdominal pain, no bloating, no constipation, no diarrhea, no nausea and no vomiting.

## 2025-03-26 NOTE — ED ADULT NURSE NOTE - PAIN RATING/NUMBER SCALE (0-10): ACTIVITY
Continue supportive care, including:    Relative rest (dial back your activity to 70% or so and gradually increase it to your normal level over the next days and weeks)  Ice (helps reduce swelling and inflammation as well as pain)  Heat  Over-the-counter pain relievers as needed such as Tylenol (acetaminophen) and ibuprofen, naproxen or similar  Topical over-the-counter pain relievers like capsiasin cream or diclofenac gel  Physical therapy -- please call [phone] or [phone] to schedule your visit   
0 (no pain/absence of nonverbal indicators of pain)